# Patient Record
Sex: MALE | Race: WHITE | Employment: FULL TIME | URBAN - METROPOLITAN AREA
[De-identification: names, ages, dates, MRNs, and addresses within clinical notes are randomized per-mention and may not be internally consistent; named-entity substitution may affect disease eponyms.]

---

## 2018-09-27 ENCOUNTER — OFFICE VISIT (OUTPATIENT)
Dept: ORTHOPEDIC SURGERY | Age: 43
End: 2018-09-27
Payer: MEDICARE

## 2018-09-27 VITALS — HEIGHT: 67 IN | WEIGHT: 225 LBS | BODY MASS INDEX: 35.31 KG/M2

## 2018-09-27 DIAGNOSIS — M65.311 TRIGGER FINGER OF RIGHT THUMB: ICD-10-CM

## 2018-09-27 PROCEDURE — 20550 NJX 1 TENDON SHEATH/LIGAMENT: CPT | Performed by: ORTHOPAEDIC SURGERY

## 2018-09-27 PROCEDURE — 99203 OFFICE O/P NEW LOW 30 MIN: CPT | Performed by: ORTHOPAEDIC SURGERY

## 2018-09-27 NOTE — PROGRESS NOTES
injection risks and instructions were discussed. I will see the patient back on an as-needed basis if he fails to have complete resolution of symptoms. Please note that this transcription was created using voice recognition software. Any errors are unintentional and may be due to voice recognition transcription.

## 2022-03-11 ENCOUNTER — APPOINTMENT (OUTPATIENT)
Dept: GENERAL RADIOLOGY | Age: 47
End: 2022-03-11
Payer: COMMERCIAL

## 2022-03-11 ENCOUNTER — HOSPITAL ENCOUNTER (EMERGENCY)
Age: 47
Discharge: HOME | End: 2022-03-12
Payer: COMMERCIAL

## 2022-03-11 DIAGNOSIS — S82.851A CLOSED TRIMALLEOLAR FRACTURE OF RIGHT ANKLE, INITIAL ENCOUNTER: Primary | ICD-10-CM

## 2022-03-11 PROCEDURE — 29515 APPLICATION SHORT LEG SPLINT: CPT

## 2022-03-11 PROCEDURE — 73600 X-RAY EXAM OF ANKLE: CPT

## 2022-03-11 PROCEDURE — 99284 EMERGENCY DEPT VISIT MOD MDM: CPT

## 2022-03-11 PROCEDURE — 6370000000 HC RX 637 (ALT 250 FOR IP): Performed by: PHYSICIAN ASSISTANT

## 2022-03-11 RX ORDER — IBUPROFEN 800 MG/1
800 TABLET ORAL ONCE
Status: DISCONTINUED | OUTPATIENT
Start: 2022-03-11 | End: 2022-03-11

## 2022-03-11 RX ADMIN — IBUPROFEN 800 MG: 100 SUSPENSION ORAL at 23:41

## 2022-03-11 ASSESSMENT — ENCOUNTER SYMPTOMS
SINUS PRESSURE: 0
NAUSEA: 0
CONSTIPATION: 0
CHEST TIGHTNESS: 0
DIARRHEA: 0
COUGH: 0
VOMITING: 0
EYE DISCHARGE: 0
SINUS PAIN: 0
RHINORRHEA: 0
EYE REDNESS: 0
ABDOMINAL PAIN: 0
SHORTNESS OF BREATH: 0
SORE THROAT: 0

## 2022-03-11 ASSESSMENT — PAIN DESCRIPTION - ORIENTATION: ORIENTATION: RIGHT

## 2022-03-11 ASSESSMENT — PAIN - FUNCTIONAL ASSESSMENT: PAIN_FUNCTIONAL_ASSESSMENT: 0-10

## 2022-03-11 ASSESSMENT — PAIN DESCRIPTION - PAIN TYPE: TYPE: ACUTE PAIN

## 2022-03-11 ASSESSMENT — PAIN SCALES - GENERAL
PAINLEVEL_OUTOF10: 5
PAINLEVEL_OUTOF10: 5

## 2022-03-11 ASSESSMENT — PAIN DESCRIPTION - LOCATION: LOCATION: ANKLE

## 2022-03-12 VITALS
OXYGEN SATURATION: 98 % | RESPIRATION RATE: 16 BRPM | DIASTOLIC BLOOD PRESSURE: 102 MMHG | TEMPERATURE: 98.3 F | HEART RATE: 92 BPM | SYSTOLIC BLOOD PRESSURE: 162 MMHG

## 2022-03-12 PROCEDURE — 6370000000 HC RX 637 (ALT 250 FOR IP): Performed by: PHYSICIAN ASSISTANT

## 2022-03-12 RX ORDER — HYDROCODONE BITARTRATE AND ACETAMINOPHEN 5; 325 MG/1; MG/1
1 TABLET ORAL ONCE
Status: COMPLETED | OUTPATIENT
Start: 2022-03-12 | End: 2022-03-12

## 2022-03-12 RX ADMIN — HYDROCODONE BITARTRATE AND ACETAMINOPHEN 1 TABLET: 5; 325 TABLET ORAL at 01:02

## 2022-03-12 ASSESSMENT — PAIN SCALES - GENERAL: PAINLEVEL_OUTOF10: 7

## 2022-03-12 NOTE — ED NOTES
Pt loaded into wheelchair, crutches and walker given to family to take to car. Reviewed discharge instructions including follow ups, prescriptions and when to come back. Pt and family verbalized understanding.        Sofi Caballero RN  03/12/22 4999

## 2022-03-12 NOTE — ED PROVIDER NOTES
[facility]  ED  EMERGENCY DEPARTMENT ENCOUNTER        Pt Name: Kelby Marinelli  MRN: [id number]  [place] 1975  Date of evaluation: 3/11/2022  Provider: Ashley Anderson PA-C  PCP: Joe Bush MD  ED Attending: No att. providers found      This patient was not seen and evaluated by the attending physician. I have independently evaluated this patient. 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 Right ankle pain. Denies hitting head or LOC.  Ankle Pain       HISTORY OF PRESENT ILLNESS   (Location/Symptom, Timing/Onset, Context/Setting, Quality, Duration, Modifying Factors, Severity)  Note limiting factors. Kelby Marinelli is a 48 y.o. male for evaluation of right ankle pain, 5/10 throbbing, pain, unable to weight bear. Sudden onset of pain, 30 mins PTA, slipped on ice at work, working at [employer], pushing carts. Denies hitting head or LOC. No tx prior to arrival.      Nursing Notes were all reviewed and agreed with or any disagreements were addressed  in the HPI. REVIEW OF SYSTEMS  (2-9 systems for level 4, 10 or more for level 5)     Review of Systems   Constitutional: Negative for chills and fever. HENT: Negative. Negative for congestion, rhinorrhea, sinus pressure, sinus pain and sore throat. Eyes: Negative for discharge, redness and visual disturbance. Respiratory: Negative for cough, chest tightness and shortness of breath. Cardiovascular: Negative for chest pain and palpitations. Gastrointestinal: Negative for abdominal pain, constipation, diarrhea, nausea and vomiting. Genitourinary: Negative for difficulty urinating, dysuria and frequency. Musculoskeletal: Positive for arthralgias and myalgias. Skin: Negative. Neurological: Negative. Negative for dizziness, weakness, numbness and headaches. Psychiatric/Behavioral: Negative.     All other systems reviewed and are negative. Positivesand Pertinent negatives as per HPI. Except as noted above in the ROS, all other systems were reviewed and negative. PAST MEDICAL HISTORY   History reviewed. No pertinent past medical history. SURGICAL HISTORY     History reviewed. No pertinent surgical history. CURRENT MEDICATIONS       Discharge Medication List as of 3/12/2022  1:07 AM            ALLERGIES     Seasonal    FAMILY HISTORY     History reviewed. No pertinent family history. SOCIAL HISTORY       Social History     Socioeconomic History    Marital status: Single     Spouse name: None    Number of children: None    Years of education: None    Highest education level: None   Occupational History    None   Tobacco Use    Smoking status: Never Smoker    Smokeless tobacco: Never Used   Vaping Use    Vaping Use: Never used   Substance and Sexual Activity    Alcohol use: Never    Drug use: Never    Sexual activity: None   Other Topics Concern    None   Social History Narrative    None     Social Determinants of Health     Financial Resource Strain:     Difficulty of Paying Living Expenses: Not on file   Food Insecurity:     Worried About Running Out of Food in the Last Year: Not on file    Zahraa of Food in the Last Year: Not on file   Transportation Needs:     Lack of Transportation (Medical): Not on file    Lack of Transportation (Non-Medical):  Not on file   Physical Activity:     Days of Exercise per Week: Not on file    Minutes of Exercise per Session: Not on file   Stress:     Feeling of Stress : Not on file   Social Connections:     Frequency of Communication with Friends and Family: Not on file    Frequency of Social Gatherings with Friends and Family: Not on file    Attends Quaker Services: Not on file    Active Member of Clubs or Organizations: Not on file    Attends Club or Organization Meetings: Not on file    Marital Status: Not on file   Intimate Partner Violence:     Fear of Current or Ex-Partner: Not on file    Emotionally Abused: Not on file    Physically Abused: Not on file    Sexually Abused: Not on file   Housing Stability:     Unable to Pay for Housing in the Last Year: Not on file    Number of Places Lived in the Last Year: Not on file    Unstable Housing in the Last Year: Not on file       SCREENINGS     NIH Score       Glascow Anisa Coma Scale  Eye Opening: Spontaneous  Best Verbal Response: Oriented  Best Motor Response: Obeys commands  [place] Coma Scale Score: 15    Glascow Peds     Heart Score         PHYSICAL EXAM    (up to 7 for level 4, 8 ormore for level 5)     ED Triage Vitals [03/11/22 2322]   BP Temp Temp Source Pulse Resp SpO2 Height Weight   (!) 158/81 98.3 °F (36.8 °C) Oral 97 16 100 % -- --       Physical Exam  Vitals and nursing note reviewed. Constitutional:       Appearance: He is well-developed. HENT:      Head: Normocephalic and atraumatic. Eyes:      General:         Right eye: No discharge. Left eye: No discharge. Pulmonary:      Effort: Pulmonary effort is normal. No respiratory distress. Musculoskeletal:      Cervical back: Normal range of motion and neck supple. Right ankle: Swelling present. Tenderness present. Decreased range of motion. Right Achilles Tendon: Normal.      Left ankle: Normal.   Skin:     General: Skin is warm and dry. Coloration: Skin is not pale. Neurological:      Mental Status: He is alert and oriented to person, place, and time. Psychiatric:         Behavior: Behavior normal.           DIAGNOSTIC RESULTS   LABS:    Labs Reviewed - No data to display    All other labs were within normal range or notreturned as of this dictation. EKG: All EKG's are interpreted by the Emergency Department Physician who either signs or Co-signs this chart in the absence of a cardiologist.  Please see their note for interpretation of EKG.       RADIOLOGY:     Interpretation per the Radiologist below, if available at the time of this note:    XR ANKLE RIGHT (2 VIEWS)   Final Result   Trimalleolar fracture. Widening of the ankle mortise likely reflecting a syndesmotic injury. CONSULTS:  None      EMERGENCY DEPARTMENT COURSE and DIFFERENTIAL DIAGNOSIS/MDM:   Vitals:    Vitals:    03/11/22 2322 03/12/22 0028   BP: (!) 158/81 (!) 162/102   Pulse: 97 92   Resp: 16 16   Temp: 98.3 °F (36.8 °C)    TempSrc: Oral Oral   SpO2: 100% 98%       Patient was given the following medications:  Medications   ibuprofen (ADVIL;MOTRIN) 100 MG/5ML suspension 800 mg (800 mg Oral Given 3/11/22 2341)   HYDROcodone-acetaminophen (NORCO) 5-325 MG per tablet 1 tablet (1 tablet Oral Given 3/12/22 0102)         Afebrile, stable, patient presents to the ED for evaluation. Non toxic, NAD, SpO2 on room air of 100%, not hypoxic, xrays ordered to r/o acute bony injury. Provided with motrin liquid for pain control, liquid per pt request. Steven Daugherty show trimalleolar fracture. discussed imaging with attending. Pt is immobilized into a splint. Advised he will need to be non weight bearing. Pts mother now at bedside. Splint will be re-evaluated and confirm the patient can ambulate non weight bearing on crutches. Advised the importance of close follow up with Orthopedic, first thing Monday. All questions are answered. Indications for return to the ED are discussed. Patient is advised if any new or worsening symptoms arise they should immediately return to the emergency room. Follow-up with primary care in 1-2 days. The patient tolerated their visit well. The patient and / or the family were informed of the results of any tests, a time was given to answer questions, a plan was proposed and they agreed Marv Zavala.     ED Course as of 03/13/22 1506   Sat Mar 12, 2022   0040 Discussed pain medication with pharmacist, who advised ok to crush [AR]   [phone] Spoke with patients mother and father discussed need to be non-weight bearing. They verbalize understanding. They advise they have a wheeled walker at home, and live in a ranch home, they feel comfortable taking the patient home at this time.  [AR]      ED Course User Index  [AR] Noemy Piper PA-C       I estimate there is LOW risk for COMPARTMENT SYNDROME, DEEP VENOUS THROMBOSIS, SEPTIC ARTHRITIS, TENDON OR NEUROVASCULAR INJURY, thus I consider the discharge disposition reasonable. Eitan Marinelli and I have discussed the diagnosis and risks, and we agree with discharging home to follow-up with their primary doctor or the referral orthopedist. We also discussed returning to the Emergency Department immediately if new or worsening symptoms occur. We have discussed the symptoms which are most concerning (e.g., changing or worsening pain, numbness, weakness) that necessitate immediate return. FINAL IMPRESSION      1. Closed trimalleolar fracture of right ankle, initial encounter          DISPOSITION/PLAN   DISPOSITION    D/c to home    PATIENT REFERRED TO:  Wu Billings MD  [phone]  [facility], [address] [place] [address]  [phone]      Call first thing MONDAY morning and set up a follow up appt. DISCHARGE MEDICATIONS:  Discharge Medication List as of 3/12/2022  1:07 AM      START taking these medications    Details   ibuprofen (CHILDRENS ADVIL) 100 MG/5ML suspension Take 30 mLs by mouth every 8 hours as needed for Pain, Disp-900 mL, R-0Print      HYDROcodone-acetaminophen 7.5-325 MG per 15ML solution Take 15 mLs by mouth every 6 hours as needed for Pain (only for most severe pain, pain medication precautions) for up to 3 days. , Disp-180 mL, R-0Print             DISCONTINUED MEDICATIONS:  Discharge Medication List as of 3/12/2022  1:07 AM            Periodic Controlled Substance Monitoring: No signs of potential drug abuse or diversion identified.  (Noemy Piper PA-C)    Pt was seen during the [place] 19 pandemic. Appropriate PPE worn by ME during patient encounters. Pt seen during a time with constrained hospital bed capacity and other potential inpatient and outpatient resources were constrained due to the viral pandemic.    Please note that portions of this note were completed with a voice recognition program.  Efforts were made to edit the dictations but occasionally words are mis-transcribed.)    Noemy Piper PA-C (electronically signed)        Noemy Piper PA-C  03/12/22 0567 Mj Doherty PA-C  03/13/22 3809

## 2022-03-15 ENCOUNTER — OFFICE VISIT (OUTPATIENT)
Dept: ORTHOPEDIC SURGERY | Age: 47
End: 2022-03-15
Payer: COMMERCIAL

## 2022-03-15 ENCOUNTER — TELEPHONE (OUTPATIENT)
Dept: ORTHOPEDIC SURGERY | Age: 47
End: 2022-03-15

## 2022-03-15 VITALS — HEIGHT: 67 IN | BODY MASS INDEX: 35.31 KG/M2 | WEIGHT: 225 LBS

## 2022-03-15 DIAGNOSIS — S82.851A CLOSED DISPLACED TRIMALLEOLAR FRACTURE OF RIGHT ANKLE, INITIAL ENCOUNTER: Primary | ICD-10-CM

## 2022-03-15 PROCEDURE — 99203 OFFICE O/P NEW LOW 30 MIN: CPT | Performed by: ORTHOPAEDIC SURGERY

## 2022-03-15 RX ORDER — LORATADINE 10 MG/1
CAPSULE, LIQUID FILLED ORAL
COMMUNITY

## 2022-03-15 RX ORDER — FENOFIBRATE 160 MG/1
TABLET ORAL
COMMUNITY
Start: 2022-03-14

## 2022-03-15 RX ORDER — AMLODIPINE BESYLATE 5 MG/1
TABLET ORAL
COMMUNITY
Start: 2022-03-14

## 2022-03-15 NOTE — PROGRESS NOTES
CHIEF COMPLAINT: Right ankle pain / trimalleolar fracture. DATE OF INJURY: 3/11/2022, Worker's Comp    HISTORY:  Mr. Ivar Severance 48 y.o.  male presents today for the first visit for evaluation of a right ankle injury which occurred when he was walking at [address] and tripped. He was first seen and evaluated in [facility], where he was x-rayed, splinted and asked to f/u with Orthopedics. He is complaining of medial & lateral ankle pain and swelling. This is better with elevation and worse with bearing any wt. The pain is sharp and not radiating. No numbness or tingling sensation. Alleviating factors: elevation and rest. No other complaint. Past Medical History:   Diagnosis Date    Hyperlipidemia     Hypertension        No past surgical history on file. Social History     Socioeconomic History    Marital status: Single     Spouse name: Not on file    Number of children: Not on file    Years of education: Not on file    Highest education level: Not on file   Occupational History    Not on file   Tobacco Use    Smoking status: Never Smoker    Smokeless tobacco: Never Used   Vaping Use    Vaping Use: Never used   Substance and Sexual Activity    Alcohol use: Never    Drug use: Never    Sexual activity: Not on file   Other Topics Concern    Not on file   Social History Narrative    Not on file     Social Determinants of Health     Financial Resource Strain:     Difficulty of Paying Living Expenses: Not on file   Food Insecurity:     Worried About [address] in the Last Year: Not on file    Zahraa of Food in the Last Year: Not on file   Transportation Needs:     Lack of Transportation (Medical): Not on file    Lack of Transportation (Non-Medical):  Not on file   Physical Activity:     Days of Exercise per Week: Not on file    Minutes of Exercise per Session: Not on file   Stress:     Feeling of Stress : Not on file   Social Connections:     Frequency of Communication with Friends and Family: Not on file    Frequency of Social Gatherings with Friends and Family: Not on file    Attends Sabianist Services: Not on file    Active Member of Clubs or Organizations: Not on file    Attends Club or Organization Meetings: Not on file    Marital Status: Not on file   Intimate Partner Violence:     Fear of Current or Ex-Partner: Not on file    Emotionally Abused: Not on file    Physically Abused: Not on file    Sexually Abused: Not on file   Housing Stability:     Unable to Pay for Housing in the Last Year: Not on file    Number of [place] in the Last Year: Not on file    Unstable Housing in the Last Year: Not on file       No family history on file. Current Outpatient Medications on File Prior to Visit   Medication Sig Dispense Refill    loratadine (CLARITIN) 10 MG capsule Take by mouth      ibuprofen (CHILDRENS ADVIL) 100 MG/5ML suspension Take 30 mLs by mouth every 8 hours as needed for Pain 900 mL 0    HYDROcodone-acetaminophen 7.5-325 MG per 15ML solution Take 15 mLs by mouth every 6 hours as needed for Pain (only for most severe pain, pain medication precautions) for up to 3 days. 180 mL 0     No current facility-administered medications on file prior to visit. Pertinent items are noted in HPI  Review of systems reviewed from Patient History Form and available in the patient's chart under the Media tab. PHYSICAL EXAMINATION:  Mr. Elisa Price is a very pleasant 48 y.o.  male who presents today in no acute distress, awake, alert, and oriented. He is well dressed, nourished and  groomed. Patient with normal affect. Height is  5' 7\" (1.702 m), weight is 225 lb (102.1 kg), Body mass index is 35.24 kg/m². Resting respiratory rate is 16. Examination of the gait, showed that the patient walks with a crutch, NWB right leg and in a splint . Examination of both ankles showing a decreased range of motion of the right ankle compare to the other side.   There is moderate swelling that can be seen, as well as ecchymosis. He has intact sensation and good pedal pulses. He has significant tenderness on deep palpation over the medial & lateral malleolus of the right ankle. IMAGING: Xrays, 3 views of the right ankle dated 3/11/2022 from [facility],  were reviewed, and showed a moderately displaced trimalleolar fracture. IMPRESSION: Right ankle moderately displaced trimalleolar fracture. PLAN:   I discussed with Ian Kumari the overall alignment of the fracture and treatment options including both surgical and non-surgical treatment, and that my recommendation is an open reduction and internal fixation given the amount of displacement and comminution of the fracture. I discussed the risks and benefits of surgery with the patient, including but not limited to infection, bleeding, pain, injury to nerves or blood vessels failure of the surgery and need for additional surgery. All the patient's questions were answered. We discussed an expected post-operative course. He  is understanding of this and wishes to proceed.        Luis Miguel Patel MD

## 2022-03-15 NOTE — TELEPHONE ENCOUNTER
Spoke to patient's mother. [facility] in [place] is the preferred location to send RX for knee scooter.     P: [phone]  F: [phone]

## 2022-03-15 NOTE — TELEPHONE ENCOUNTER
Other PATIENT WANTING TO KNOW IF HE COULD USE A  KNEE SCOOTER AND IF HE CAN GET A RX FOR ONE, PLS CALL TO [facility]ISE  [phone]

## 2022-03-15 NOTE — LETTER
[facility]  Surgery Scheduling Form:      DEMOGRAPHICS:                                                                                                                  Patient Name:    Patient :  1975   Patient SS#:      Patient Phone:  [phone] (home) [phone] (work) Alt. Patient Phone:    Patient Address:  [facility] [address]    PCP:  Tez Nagy MD    Insurance ID Number:  Payor/Plan Subscr  Sex Relation Sub. Ins. ID Effective Group Num   1. Murl Collado * BERNARDO MILLAN 1975 Male Self [id number] 3/11/22                                    [address] [address]   2. [employer]* BERNARDO MILLAN 1975 Male Self [id number] 3/1/22 [id number]                                   [address] [address]       DIAGNOSIS & PROCEDURE:                                                                                                Diagnosis:   Right trimalleolar ankle fracture S82.851A  Operation:  Open reduction internal fixation Right ankle with syndesmosis repair 42979,  75267  Location: [facility]  Provider:  Khris Eubanks MD      [facility] INFORMATION:                                                                                         .    Requested Date:   22   OR Time:  9:45                      Patient Arrival Time:  7:45  OR Time Required:  60 Minutes  Anesthesia:  General  Equipment:  [place]  Mini C-Arm:  No   Standard C-Arm:  Yes  Status:  Outpatient  PAT Required:  Yes  Comments:   COVID: to be completed day of surgery        Cherie Eubanks MD     03/15/22   Pre Operative Physician Prophylaxis Orders - SCIP Protocols      Patient:   :    1975    Procedure: 3-17-22 Open reduction internal fixation Right ankle 98253    COVID: to be completed day of surgery  HEIGHT:  Ht Readings from Last 1 Encounters:   03/15/22 5' 7\" (1.702 m)                      WEIGHT:  Wt Readings from Last 1 Encounters:   03/15/22 225 lb (102.1 kg) History & Physical:  Mamadou.Search ] By Surgeon  [ ]By PCP  [ Kb Retana Report     Pre-Operative Antibiotic Order:     []  ----  No Antibiotic Ordered       [x]  ----  Give the following Antibiotic within 1 hour prior to start time:                                                      Cefazolin 2g IV <119.9kg (264lbs) OR 3g if >120kg (802DAH)       or      Clindamycin 900 mg IV (over 1 hour) if patient is allergic to           PENICILLINS or CAPHALOSPORIN       VTE prophylaxis [ ] Thigh hi  TEDS  [ ]  Knee Hi TEDS [ ] Foot Pumps [ ] Compression Devices      Physician Signature:     Date: 3/15/22  Time: 10:38 AM

## 2022-03-17 ENCOUNTER — HOSPITAL ENCOUNTER (OUTPATIENT)
Age: 47
Setting detail: OUTPATIENT SURGERY
Discharge: HOME | End: 2022-03-17
Attending: ORTHOPAEDIC SURGERY | Admitting: ORTHOPAEDIC SURGERY
Payer: COMMERCIAL

## 2022-03-17 ENCOUNTER — ANESTHESIA (OUTPATIENT)
Dept: OPERATING ROOM | Age: 47
End: 2022-03-17
Payer: COMMERCIAL

## 2022-03-17 ENCOUNTER — APPOINTMENT (OUTPATIENT)
Dept: GENERAL RADIOLOGY | Age: 47
End: 2022-03-17
Attending: ORTHOPAEDIC SURGERY
Payer: COMMERCIAL

## 2022-03-17 ENCOUNTER — ANESTHESIA EVENT (OUTPATIENT)
Dept: OPERATING ROOM | Age: 47
End: 2022-03-17
Payer: COMMERCIAL

## 2022-03-17 VITALS
OXYGEN SATURATION: 96 % | RESPIRATION RATE: 13 BRPM | DIASTOLIC BLOOD PRESSURE: 67 MMHG | TEMPERATURE: 97.9 F | SYSTOLIC BLOOD PRESSURE: 122 MMHG

## 2022-03-17 VITALS
HEIGHT: 67 IN | DIASTOLIC BLOOD PRESSURE: 73 MMHG | WEIGHT: 230 LBS | OXYGEN SATURATION: 93 % | TEMPERATURE: 97.6 F | HEART RATE: 80 BPM | RESPIRATION RATE: 19 BRPM | SYSTOLIC BLOOD PRESSURE: 105 MMHG | BODY MASS INDEX: 36.1 KG/M2

## 2022-03-17 DIAGNOSIS — S82.851A CLOSED DISPLACED TRIMALLEOLAR FRACTURE OF RIGHT ANKLE, INITIAL ENCOUNTER: Primary | ICD-10-CM

## 2022-03-17 LAB — SARS-COV-2, NAAT: NOT DETECTED

## 2022-03-17 PROCEDURE — 2580000003 HC RX 258: Performed by: ANESTHESIOLOGY

## 2022-03-17 PROCEDURE — 2720000010 HC SURG SUPPLY STERILE: Performed by: ORTHOPAEDIC SURGERY

## 2022-03-17 PROCEDURE — 27822 TREATMENT OF ANKLE FRACTURE: CPT | Performed by: ORTHOPAEDIC SURGERY

## 2022-03-17 PROCEDURE — 2500000003 HC RX 250 WO HCPCS: Performed by: NURSE ANESTHETIST, CERTIFIED REGISTERED

## 2022-03-17 PROCEDURE — 27822 TREATMENT OF ANKLE FRACTURE: CPT | Performed by: NURSE PRACTITIONER

## 2022-03-17 PROCEDURE — 2580000003 HC RX 258: Performed by: NURSE ANESTHETIST, CERTIFIED REGISTERED

## 2022-03-17 PROCEDURE — 7100000001 HC PACU RECOVERY - ADDTL 15 MIN: Performed by: ORTHOPAEDIC SURGERY

## 2022-03-17 PROCEDURE — 7100000011 HC PHASE II RECOVERY - ADDTL 15 MIN: Performed by: ORTHOPAEDIC SURGERY

## 2022-03-17 PROCEDURE — 2709999900 HC NON-CHARGEABLE SUPPLY: Performed by: ORTHOPAEDIC SURGERY

## 2022-03-17 PROCEDURE — 3209999900 FLUORO FOR SURGICAL PROCEDURES

## 2022-03-17 PROCEDURE — 87635 SARS-COV-2 COVID-19 AMP PRB: CPT

## 2022-03-17 PROCEDURE — 73600 X-RAY EXAM OF ANKLE: CPT

## 2022-03-17 PROCEDURE — 3600000004 HC SURGERY LEVEL 4 BASE: Performed by: ORTHOPAEDIC SURGERY

## 2022-03-17 PROCEDURE — 27829 TREAT LOWER LEG JOINT: CPT | Performed by: NURSE PRACTITIONER

## 2022-03-17 PROCEDURE — 2500000003 HC RX 250 WO HCPCS: Performed by: ORTHOPAEDIC SURGERY

## 2022-03-17 PROCEDURE — 27829 TREAT LOWER LEG JOINT: CPT | Performed by: ORTHOPAEDIC SURGERY

## 2022-03-17 PROCEDURE — 7100000000 HC PACU RECOVERY - FIRST 15 MIN: Performed by: ORTHOPAEDIC SURGERY

## 2022-03-17 PROCEDURE — 7100000010 HC PHASE II RECOVERY - FIRST 15 MIN: Performed by: ORTHOPAEDIC SURGERY

## 2022-03-17 PROCEDURE — 3600000014 HC SURGERY LEVEL 4 ADDTL 15MIN: Performed by: ORTHOPAEDIC SURGERY

## 2022-03-17 PROCEDURE — 6370000000 HC RX 637 (ALT 250 FOR IP): Performed by: ANESTHESIOLOGY

## 2022-03-17 PROCEDURE — 6360000002 HC RX W HCPCS: Performed by: NURSE ANESTHETIST, CERTIFIED REGISTERED

## 2022-03-17 PROCEDURE — C1713 ANCHOR/SCREW BN/BN,TIS/BN: HCPCS | Performed by: ORTHOPAEDIC SURGERY

## 2022-03-17 PROCEDURE — A4217 STERILE WATER/SALINE, 500 ML: HCPCS | Performed by: ORTHOPAEDIC SURGERY

## 2022-03-17 PROCEDURE — 3700000001 HC ADD 15 MINUTES (ANESTHESIA): Performed by: ORTHOPAEDIC SURGERY

## 2022-03-17 PROCEDURE — 3700000000 HC ANESTHESIA ATTENDED CARE: Performed by: ORTHOPAEDIC SURGERY

## 2022-03-17 PROCEDURE — 6360000002 HC RX W HCPCS: Performed by: ORTHOPAEDIC SURGERY

## 2022-03-17 PROCEDURE — 2580000003 HC RX 258: Performed by: ORTHOPAEDIC SURGERY

## 2022-03-17 DEVICE — SCREW BNE L16MM DIA3.5MM HD DIA2.7MM PERIARTC CORT S STL ST: Type: IMPLANTABLE DEVICE | Site: ANKLE | Status: FUNCTIONAL

## 2022-03-17 DEVICE — PLATE BNE L80MM 4 H R LAT DST PERIARTC FIBULAR S STL LOK: Type: IMPLANTABLE DEVICE | Site: ANKLE | Status: FUNCTIONAL

## 2022-03-17 DEVICE — SCREW BNE L14MM DIA2.7MM HEX HD DIA2.5MM CANC BIODUR 108C: Type: IMPLANTABLE DEVICE | Site: ANKLE | Status: FUNCTIONAL

## 2022-03-17 DEVICE — SCREW BNE L65MM DIA4MM CANC SELF DRL ST CANN NONLOCKING 1 2
Type: IMPLANTABLE DEVICE | Site: ANKLE | Status: NON-FUNCTIONAL
Removed: 2022-08-04

## 2022-03-17 DEVICE — SCREW BNE L50MM DIA3.5MM HD DIA2.7MM LNG PERIARTC ST: Type: IMPLANTABLE DEVICE | Site: ANKLE | Status: FUNCTIONAL

## 2022-03-17 DEVICE — SCREW BNE L18MM DIA2.7MM HEX HD DIA2.5MM CANC BIODUR 108C: Type: IMPLANTABLE DEVICE | Site: ANKLE | Status: FUNCTIONAL

## 2022-03-17 DEVICE — SCREW BONE L70MM D4MM STNDRD CNCLLS SELF TPPNG SELF DRLLNG C: Type: IMPLANTABLE DEVICE | Site: ANKLE | Status: FUNCTIONAL

## 2022-03-17 DEVICE — SCREW BNE L14MM DIA3.5MM HD DIA2.7MM CORT PERIARTC S STL ST: Type: IMPLANTABLE DEVICE | Site: ANKLE | Status: FUNCTIONAL

## 2022-03-17 DEVICE — SCREW BNE L16MM DIA2.7MM HEX HD DIA2.5MM CANC BIODUR 108C: Type: IMPLANTABLE DEVICE | Site: ANKLE | Status: FUNCTIONAL

## 2022-03-17 DEVICE — SCREW BNE L22MM DIA2.7MM CORT ANK FT S STL ST NONCANNULATED: Type: IMPLANTABLE DEVICE | Site: ANKLE | Status: FUNCTIONAL

## 2022-03-17 RX ORDER — BUPIVACAINE HYDROCHLORIDE 5 MG/ML
INJECTION, SOLUTION EPIDURAL; INTRACAUDAL
Status: COMPLETED | OUTPATIENT
Start: 2022-03-17 | End: 2022-03-17

## 2022-03-17 RX ORDER — HYDROMORPHONE HCL 110MG/55ML
PATIENT CONTROLLED ANALGESIA SYRINGE INTRAVENOUS PRN
Status: DISCONTINUED | OUTPATIENT
Start: 2022-03-17 | End: 2022-03-17

## 2022-03-17 RX ORDER — HYDROMORPHONE HCL 110MG/55ML
PATIENT CONTROLLED ANALGESIA SYRINGE INTRAVENOUS PRN
Status: DISCONTINUED | OUTPATIENT
Start: 2022-03-17 | End: 2022-03-17 | Stop reason: SDUPTHER

## 2022-03-17 RX ORDER — LABETALOL HYDROCHLORIDE 5 MG/ML
5 INJECTION, SOLUTION INTRAVENOUS
Status: DISCONTINUED | OUTPATIENT
Start: 2022-03-17 | End: 2022-03-17 | Stop reason: HOSPADM

## 2022-03-17 RX ORDER — FENTANYL CITRATE 50 UG/ML
INJECTION, SOLUTION INTRAMUSCULAR; INTRAVENOUS PRN
Status: DISCONTINUED | OUTPATIENT
Start: 2022-03-17 | End: 2022-03-17 | Stop reason: SDUPTHER

## 2022-03-17 RX ORDER — DEXAMETHASONE SODIUM PHOSPHATE 4 MG/ML
INJECTION, SOLUTION INTRA-ARTICULAR; INTRALESIONAL; INTRAMUSCULAR; INTRAVENOUS; SOFT TISSUE PRN
Status: DISCONTINUED | OUTPATIENT
Start: 2022-03-17 | End: 2022-03-17 | Stop reason: SDUPTHER

## 2022-03-17 RX ORDER — MIDAZOLAM HYDROCHLORIDE 1 MG/ML
INJECTION INTRAMUSCULAR; INTRAVENOUS PRN
Status: DISCONTINUED | OUTPATIENT
Start: 2022-03-17 | End: 2022-03-17 | Stop reason: SDUPTHER

## 2022-03-17 RX ORDER — HYDROMORPHONE HCL 110MG/55ML
0.25 PATIENT CONTROLLED ANALGESIA SYRINGE INTRAVENOUS EVERY 5 MIN PRN
Status: DISCONTINUED | OUTPATIENT
Start: 2022-03-17 | End: 2022-03-17 | Stop reason: HOSPADM

## 2022-03-17 RX ORDER — HYDROCODONE BITARTRATE AND ACETAMINOPHEN 5; 325 MG/1; MG/1
1 TABLET ORAL EVERY 6 HOURS PRN
Qty: 10 TABLET | Refills: 0 | Status: SHIPPED | OUTPATIENT
Start: 2022-03-17 | End: 2022-03-20

## 2022-03-17 RX ORDER — ACETAMINOPHEN 160 MG/5ML
15 SUSPENSION, ORAL (FINAL DOSE FORM) ORAL EVERY 4 HOURS PRN
COMMUNITY

## 2022-03-17 RX ORDER — ACETAMINOPHEN 160 MG/5ML
650 SOLUTION ORAL ONCE
Status: COMPLETED | OUTPATIENT
Start: 2022-03-17 | End: 2022-03-17

## 2022-03-17 RX ORDER — SODIUM CHLORIDE 9 MG/ML
INJECTION, SOLUTION INTRAVENOUS CONTINUOUS PRN
Status: DISCONTINUED | OUTPATIENT
Start: 2022-03-17 | End: 2022-03-17 | Stop reason: SDUPTHER

## 2022-03-17 RX ORDER — CEPHALEXIN 500 MG/1
500 CAPSULE ORAL 4 TIMES DAILY
Qty: 20 CAPSULE | Refills: 0 | Status: SHIPPED | OUTPATIENT
Start: 2022-03-17 | End: 2022-03-18 | Stop reason: ALTCHOICE

## 2022-03-17 RX ORDER — ONDANSETRON 2 MG/ML
4 INJECTION INTRAMUSCULAR; INTRAVENOUS
Status: DISCONTINUED | OUTPATIENT
Start: 2022-03-17 | End: 2022-03-17 | Stop reason: HOSPADM

## 2022-03-17 RX ORDER — HYDROMORPHONE HCL 110MG/55ML
0.5 PATIENT CONTROLLED ANALGESIA SYRINGE INTRAVENOUS EVERY 5 MIN PRN
Status: DISCONTINUED | OUTPATIENT
Start: 2022-03-17 | End: 2022-03-17 | Stop reason: HOSPADM

## 2022-03-17 RX ORDER — ROCURONIUM BROMIDE 10 MG/ML
INJECTION, SOLUTION INTRAVENOUS PRN
Status: DISCONTINUED | OUTPATIENT
Start: 2022-03-17 | End: 2022-03-17 | Stop reason: SDUPTHER

## 2022-03-17 RX ORDER — SODIUM CHLORIDE 9 MG/ML
INJECTION, SOLUTION INTRAVENOUS CONTINUOUS
Status: DISCONTINUED | OUTPATIENT
Start: 2022-03-17 | End: 2022-03-17 | Stop reason: HOSPADM

## 2022-03-17 RX ORDER — LIDOCAINE HYDROCHLORIDE 10 MG/ML
1 INJECTION, SOLUTION EPIDURAL; INFILTRATION; INTRACAUDAL; PERINEURAL
Status: DISCONTINUED | OUTPATIENT
Start: 2022-03-17 | End: 2022-03-17 | Stop reason: HOSPADM

## 2022-03-17 RX ORDER — SUCCINYLCHOLINE/SOD CL,ISO/PF 200MG/10ML
SYRINGE (ML) INTRAVENOUS PRN
Status: DISCONTINUED | OUTPATIENT
Start: 2022-03-17 | End: 2022-03-17 | Stop reason: SDUPTHER

## 2022-03-17 RX ORDER — LIDOCAINE HYDROCHLORIDE 20 MG/ML
INJECTION, SOLUTION EPIDURAL; INFILTRATION; INTRACAUDAL; PERINEURAL PRN
Status: DISCONTINUED | OUTPATIENT
Start: 2022-03-17 | End: 2022-03-17 | Stop reason: SDUPTHER

## 2022-03-17 RX ORDER — GLYCOPYRROLATE 1 MG/5 ML
SYRINGE (ML) INTRAVENOUS PRN
Status: DISCONTINUED | OUTPATIENT
Start: 2022-03-17 | End: 2022-03-17 | Stop reason: SDUPTHER

## 2022-03-17 RX ORDER — PROPOFOL 10 MG/ML
INJECTION, EMULSION INTRAVENOUS PRN
Status: DISCONTINUED | OUTPATIENT
Start: 2022-03-17 | End: 2022-03-17 | Stop reason: SDUPTHER

## 2022-03-17 RX ORDER — ONDANSETRON 2 MG/ML
INJECTION INTRAMUSCULAR; INTRAVENOUS PRN
Status: DISCONTINUED | OUTPATIENT
Start: 2022-03-17 | End: 2022-03-17 | Stop reason: SDUPTHER

## 2022-03-17 RX ORDER — KETAMINE HCL IN NACL, ISO-OSM 100MG/10ML
SYRINGE (ML) INJECTION PRN
Status: DISCONTINUED | OUTPATIENT
Start: 2022-03-17 | End: 2022-03-17 | Stop reason: SDUPTHER

## 2022-03-17 RX ORDER — OXYCODONE HYDROCHLORIDE 5 MG/1
5 TABLET ORAL
Status: DISCONTINUED | OUTPATIENT
Start: 2022-03-17 | End: 2022-03-17 | Stop reason: HOSPADM

## 2022-03-17 RX ADMIN — HYDROMORPHONE HYDROCHLORIDE 1 MG: 2 INJECTION, SOLUTION INTRAMUSCULAR; INTRAVENOUS; SUBCUTANEOUS at 11:36

## 2022-03-17 RX ADMIN — Medication 160 MG: at 09:55

## 2022-03-17 RX ADMIN — FENTANYL CITRATE 50 MCG: 50 INJECTION, SOLUTION INTRAMUSCULAR; INTRAVENOUS at 09:55

## 2022-03-17 RX ADMIN — ACETAMINOPHEN ORAL SOLUTION 650 MG: 650 SOLUTION ORAL at 08:28

## 2022-03-17 RX ADMIN — CEFAZOLIN 2000 MG: 10 INJECTION, POWDER, FOR SOLUTION INTRAVENOUS at 09:48

## 2022-03-17 RX ADMIN — PROPOFOL 140 MG: 10 INJECTION, EMULSION INTRAVENOUS at 09:55

## 2022-03-17 RX ADMIN — SODIUM CHLORIDE: 9 INJECTION, SOLUTION INTRAVENOUS at 07:48

## 2022-03-17 RX ADMIN — HYDROMORPHONE HYDROCHLORIDE 1 MG: 2 INJECTION, SOLUTION INTRAMUSCULAR; INTRAVENOUS; SUBCUTANEOUS at 11:28

## 2022-03-17 RX ADMIN — SUGAMMADEX 200 MG: 100 INJECTION, SOLUTION INTRAVENOUS at 11:34

## 2022-03-17 RX ADMIN — ROCURONIUM BROMIDE 10 MG: 10 INJECTION, SOLUTION INTRAVENOUS at 09:55

## 2022-03-17 RX ADMIN — FENTANYL CITRATE 50 MCG: 50 INJECTION, SOLUTION INTRAMUSCULAR; INTRAVENOUS at 10:10

## 2022-03-17 RX ADMIN — ONDANSETRON 4 MG: 2 INJECTION INTRAMUSCULAR; INTRAVENOUS at 10:08

## 2022-03-17 RX ADMIN — DEXAMETHASONE SODIUM PHOSPHATE 8 MG: 4 INJECTION, SOLUTION INTRAMUSCULAR; INTRAVENOUS at 10:10

## 2022-03-17 RX ADMIN — LIDOCAINE HYDROCHLORIDE 100 MG: 20 INJECTION, SOLUTION EPIDURAL; INFILTRATION; INTRACAUDAL; PERINEURAL at 09:55

## 2022-03-17 RX ADMIN — Medication 30 MG: at 10:10

## 2022-03-17 RX ADMIN — Medication 0.2 MG: at 10:24

## 2022-03-17 RX ADMIN — MIDAZOLAM 2 MG: 1 INJECTION INTRAMUSCULAR; INTRAVENOUS at 09:49

## 2022-03-17 RX ADMIN — ROCURONIUM BROMIDE 40 MG: 10 INJECTION, SOLUTION INTRAVENOUS at 10:21

## 2022-03-17 RX ADMIN — SODIUM CHLORIDE: 9 INJECTION, SOLUTION INTRAVENOUS at 09:49

## 2022-03-17 ASSESSMENT — PULMONARY FUNCTION TESTS
PIF_VALUE: 27
PIF_VALUE: 15
PIF_VALUE: 26
PIF_VALUE: 27
PIF_VALUE: 26
PIF_VALUE: 26
PIF_VALUE: 27
PIF_VALUE: 27
PIF_VALUE: 26
PIF_VALUE: 26
PIF_VALUE: 27
PIF_VALUE: 23
PIF_VALUE: 27
PIF_VALUE: 31
PIF_VALUE: 26
PIF_VALUE: 26
PIF_VALUE: 27
PIF_VALUE: 26
PIF_VALUE: 26
PIF_VALUE: 23
PIF_VALUE: 26
PIF_VALUE: 2
PIF_VALUE: 26
PIF_VALUE: 23
PIF_VALUE: 2
PIF_VALUE: 26
PIF_VALUE: 26
PIF_VALUE: 27
PIF_VALUE: 2
PIF_VALUE: 27
PIF_VALUE: 27
PIF_VALUE: 1
PIF_VALUE: 18
PIF_VALUE: 23
PIF_VALUE: 27
PIF_VALUE: 6
PIF_VALUE: 27
PIF_VALUE: 23
PIF_VALUE: 27
PIF_VALUE: 26
PIF_VALUE: 26
PIF_VALUE: 19
PIF_VALUE: 27
PIF_VALUE: 1
PIF_VALUE: 26
PIF_VALUE: 9
PIF_VALUE: 1
PIF_VALUE: 24
PIF_VALUE: 26
PIF_VALUE: 16
PIF_VALUE: 26
PIF_VALUE: 27
PIF_VALUE: 31
PIF_VALUE: 26
PIF_VALUE: 7
PIF_VALUE: 26
PIF_VALUE: 26
PIF_VALUE: 23
PIF_VALUE: 24
PIF_VALUE: 0
PIF_VALUE: 27
PIF_VALUE: 3
PIF_VALUE: 23
PIF_VALUE: 27
PIF_VALUE: 26
PIF_VALUE: 5
PIF_VALUE: 12
PIF_VALUE: 0
PIF_VALUE: 23
PIF_VALUE: 26
PIF_VALUE: 27
PIF_VALUE: 27
PIF_VALUE: 4
PIF_VALUE: 1
PIF_VALUE: 19
PIF_VALUE: 1
PIF_VALUE: 2
PIF_VALUE: 26
PIF_VALUE: 0
PIF_VALUE: 24
PIF_VALUE: 26
PIF_VALUE: 23
PIF_VALUE: 0
PIF_VALUE: 27
PIF_VALUE: 27
PIF_VALUE: 26
PIF_VALUE: 26
PIF_VALUE: 30
PIF_VALUE: 4
PIF_VALUE: 26
PIF_VALUE: 27
PIF_VALUE: 6
PIF_VALUE: 27
PIF_VALUE: 26
PIF_VALUE: 26
PIF_VALUE: 2
PIF_VALUE: 26
PIF_VALUE: 27
PIF_VALUE: 26
PIF_VALUE: 23
PIF_VALUE: 26
PIF_VALUE: 23
PIF_VALUE: 25
PIF_VALUE: 23
PIF_VALUE: 26
PIF_VALUE: 26
PIF_VALUE: 27
PIF_VALUE: 26
PIF_VALUE: 6

## 2022-03-17 ASSESSMENT — PAIN - FUNCTIONAL ASSESSMENT
PAIN_FUNCTIONAL_ASSESSMENT: 0-10
PAIN_FUNCTIONAL_ASSESSMENT: PREVENTS OR INTERFERES SOME ACTIVE ACTIVITIES AND ADLS

## 2022-03-17 ASSESSMENT — PAIN SCALES - GENERAL: PAINLEVEL_OUTOF10: 4

## 2022-03-17 ASSESSMENT — PAIN DESCRIPTION - DESCRIPTORS: DESCRIPTORS: STABBING;THROBBING

## 2022-03-17 NOTE — ANESTHESIA PRE PROCEDURE
Department of Anesthesiology  Preprocedure Note       Name:  Honey Schmid   Age:  48 y.o.  :  1975                                          MRN:  [id number]         Date:  3/17/2022      Surgeon: Luigi Guillen):  Alexandra Florence MD    Procedure: Procedure(s):  OPEN REDUCTION INTERNAL FIXATION RIGHT ANKLE WITH SYNDESMOSIS REPAIR (39384, 60048)-RADHA    Medications prior to admission:   Prior to Admission medications    Medication Sig Start Date End Date Taking? Authorizing Provider   acetaminophen (TYLENOL) 160 MG/5ML suspension Take 15 mg/kg by mouth every 4 hours as needed for Fever   Yes Historical Provider, MD   fenofibrate (TRIGLIDE) 160 MG tablet TAKE 1 TABLET EVERY DAY WITH FOOD 3/14/22  Yes Historical Provider, MD   amLODIPine (NORVASC) 5 MG tablet TAKE 1 TABLET EVERY DAY 3/14/22  Yes Historical Provider, MD   loratadine (CLARITIN) 10 MG capsule Take by mouth    Historical Provider, MD   ibuprofen (CHILDRENS ADVIL) 100 MG/5ML suspension Take 30 mLs by mouth every 8 hours as needed for Pain 3/12/22 3/22/22  Karuna Patterson PA-C       Current medications:    Current Facility-Administered Medications   Medication Dose Route Frequency Provider Last Rate Last Admin    ceFAZolin (ANCEF) 2000 mg in dextrose 5 % 50 mL IVPB  2,000 mg IntraVENous On Call to [address] [place] [address], MD        HYDROmorphone (DILAUDID) injection 0.25 mg  0.25 mg IntraVENous Q5 Min PRN Karlie Biswas MD        HYDROmorphone (DILAUDID) injection 0.5 mg  0.5 mg IntraVENous Q5 Min PRN Karlie Biswas MD        oxyCODONE (ROXICODONE) immediate release tablet 5 mg  5 mg Oral Once PRN Karlie Biswas MD        ondansetron [facility]) injection 4 mg  4 mg IntraVENous Once PRN Karlie Biswas MD        labetalol (NORMODYNE;TRANDATE) injection 5 mg  5 mg IntraVENous Q15 Min PRN Karlie Biswas MD        0.9 % sodium chloride infusion   IntraVENous Continuous Karlie Biswas MD           Allergies:     Allergies   Allergen Reactions  Seasonal        Problem List:    Patient Active Problem List   Diagnosis Code    Trigger finger of right thumb M65.311    Closed displaced trimalleolar fracture of right lower leg S82.851A       Past Medical History:        Diagnosis Date    Hyperlipidemia     Hypertension        Past Surgical History:  History reviewed. No pertinent surgical history. Social History:    Social History     Tobacco Use    Smoking status: Never Smoker    Smokeless tobacco: Never Used   Substance Use Topics    Alcohol use: Never                                Counseling given: Not Answered      Vital Signs (Current):   Vitals:    03/15/22 1520 03/17/22 0726   BP:  (!) 157/89   Pulse:  90   Resp:  16   Temp:  98.5 °F (36.9 °C)   TempSrc:  Temporal   SpO2:  98%   Weight: 234 lb (106.1 kg) 230 lb (104.3 kg)   Height: 5' 7\" (1.702 m) 5' 7\" (1.702 m)                                              BP Readings from Last 3 Encounters:   03/17/22 (!) 157/89   03/12/22 (!) 162/102       NPO Status:                                                                                 BMI:   Wt Readings from Last 3 Encounters:   03/17/22 230 lb (104.3 kg)   03/15/22 225 lb (102.1 kg)   09/27/18 225 lb (102.1 kg)     Body mass index is 36.02 kg/m². CBC: No results found for: WBC, RBC, HGB, HCT, MCV, RDW, PLT    CMP: No results found for: NA, K, CL, CO2, BUN, CREATININE, GFRAA, AGRATIO, LABGLOM, GLUCOSE, GLU, PROT, CALCIUM, BILITOT, ALKPHOS, AST, ALT    POC Tests: No results for input(s): POCGLU, POCNA, POCK, POCCL, POCBUN, POCHEMO, POCHCT in the last 72 hours.     Coags: No results found for: PROTIME, INR, APTT    HCG (If Applicable): No results found for: PREGTESTUR, PREGSERUM, HCG, HCGQUANT     ABGs: No results found for: PHART, PO2ART, ORH0EFL, SYQ2LLG, BEART, M3FPRWAE     Type & Screen (If Applicable):  No results found for: LABABO, LABRH    Drug/Infectious Status (If Applicable):  No results found for: HIV, HEPCAB    COVID-19 Screening (If Applicable):   Lab Results   Component Value Date    COVID19 Not Detected 03/17/2022           Anesthesia Evaluation  Patient summary reviewed and Nursing notes reviewed no history of anesthetic complications:   Airway: Mallampati: III  TM distance: >3 FB   Neck ROM: full  Mouth opening: > = 3 FB Dental: normal exam         Pulmonary:Negative Pulmonary ROS breath sounds clear to auscultation                             Cardiovascular:  Exercise tolerance: good (>4 METS),   (+) hypertension:, hyperlipidemia    (-) CABG/stent, dysrhythmias and  angina      Rhythm: regular  Rate: normal                    Neuro/Psych:      (-) seizures, TIA and CVA           GI/Hepatic/Renal:        (-) GERD       Endo/Other:        (-) hypothyroidism, hyperthyroidism               Abdominal:             Vascular:     - DVT and PE. Other Findings:             Anesthesia Plan      general     ASA 2       Induction: intravenous. MIPS: Postoperative opioids intended and Prophylactic antiemetics administered. Anesthetic plan and risks discussed with patient. Plan discussed with CRNA.                   Cassidy Carlson MD   3/17/2022

## 2022-03-17 NOTE — PROGRESS NOTES
Patient arrived from OR to PACU. Oral airway in place. On 15 L simple mask. NSR on the monitor. VSS. drsg to right leg CDI. Skin warm, brisk cap refill, elevated and ice applied.     Electronically signed by Eris Allen RN on 3/17/2022 at 8799

## 2022-03-17 NOTE — PROGRESS NOTES
Teaching / education initiated regarding perioperative experience, expectations, and pain management during stay. Patient and pt's mother Bruna Beal verbalized understanding.

## 2022-03-17 NOTE — PROGRESS NOTES
Pt still on 2-3L NC at this time. Sleeping with RR greater than 10. Pt responds to voice. Sts 'I am just so sleepy.' Vss. Will continue to monitor.

## 2022-03-17 NOTE — PROGRESS NOTES
Report given to Marty Cuevas RN    Electronically signed by Kathleen Moise RN on 3/17/2022 at 12:20 PM

## 2022-03-17 NOTE — ANESTHESIA POSTPROCEDURE EVALUATION
Department of Anesthesiology  Postprocedure Note    Patient: Richard Umana  MRN: [id number]  YOB: 1975  Date of evaluation: 3/17/2022  Time:  1:54 PM     Procedure Summary     Date: 03/17/22 Room / Location: [address]    Anesthesia Start: 8544 Anesthesia Stop: 7771    Procedure: OPEN REDUCTION INTERNAL FIXATION RIGHT ANKLE WITH SYNDESMOSIS REPAIR (10861, 24622)-RADHA (Right Ankle) Diagnosis: (H28.536K  RIGHT TRIMALLEOLAR ANKLE FRACTURE)    Surgeons: Vicenta Cummins MD Responsible Provider: Nishant Falcon MD    Anesthesia Type: general ASA Status: 2          Anesthesia Type: general    Pia Phase I: Pia Score: 8    Pia Phase II:      Last vitals: Reviewed and per EMR flowsheets.        Anesthesia Post Evaluation    Patient location during evaluation: PACU  Patient participation: complete - patient participated  Level of consciousness: awake  Airway patency: patent  Nausea & Vomiting: no vomiting  Complications: no  Cardiovascular status: hemodynamically stable  Respiratory status: acceptable  Hydration status: euvolemic  Multimodal analgesia pain management approach

## 2022-03-17 NOTE — H&P
Preoperative H&P Update    The patient's History and Physical in the medical record from 3/15/2022 was reviewed by me today. Past Medical History:   Diagnosis Date    Hyperlipidemia     Hypertension      History reviewed. No pertinent surgical history. No current facility-administered medications on file prior to encounter. Current Outpatient Medications on File Prior to Encounter   Medication Sig Dispense Refill    acetaminophen (TYLENOL) 160 MG/5ML suspension Take 15 mg/kg by mouth every 4 hours as needed for Fever      fenofibrate (TRIGLIDE) 160 MG tablet TAKE 1 TABLET EVERY DAY WITH FOOD      amLODIPine (NORVASC) 5 MG tablet TAKE 1 TABLET EVERY DAY      loratadine (CLARITIN) 10 MG capsule Take by mouth      ibuprofen (CHILDRENS ADVIL) 100 MG/5ML suspension Take 30 mLs by mouth every 8 hours as needed for Pain 900 mL 0       Allergies   Allergen Reactions    Seasonal       I reviewed the HPI, medications, allergies, reason for surgery, diagnosis and treatment plan and there has been no change. The patient was evaluated by me today. Physical exam findings for this update include:    Vitals:    03/17/22 0726   BP: (!) 157/89   Pulse: 90   Resp: 16   Temp: 98.5 °F (36.9 °C)   SpO2: 98%     Airway is intact  Chest: chest clear, no wheezing, rales, normal symmetric air entry, no tachypnea, retractions or cyanosis  Heart: regular rate and rhythm ; heart sounds normal  Findings on exam of the body region where surgery is to be performed include:  Right ankle trimalleolar fracture.     Electronically signed by Danilo Lim MD on 3/17/2022 at 8:21 AM

## 2022-03-17 NOTE — PROGRESS NOTES
Discharge instructions review with patients mom - Janusz Evans. All home medications have been reviewed, pt v/u. Discharge instructions signed. Pt discharged via wheelchair. Pt discharged with 2 rx and all belongings. Mom taking stable pt home.

## 2022-03-17 NOTE — BRIEF OP NOTE
Brief Postoperative Note      Patient: Gino Zambrano  YOB: 1975  MRN: [id number]    Date of Procedure: 3/17/2022    Pre-Op Diagnosis: S82.851A  RIGHT TRIMALLEOLAR ANKLE FRACTURE, SYNDESMOSIS REPAIR. Post-Op Diagnosis: Same       Procedure(s):  OPEN REDUCTION INTERNAL FIXATION RIGHT ANKLE WITH SYNDESMOSIS REPAIR (47538, 61306)-RADHA    Surgeon(s):  Carolyn Scales MD    Assistant: Aroldo Gleason CNP    Anesthesia: General    Estimated Blood Loss (mL): Minimal    Complications: None    Specimens:   * No specimens in log *    Implants:  Implant Name Type Inv. Item Serial No.  Lot No. LRB No. Used Action   PLATE BNE [id number] 4 H R LAT DST PERIARTC FIBULAR S STL WILLY - [id number]  PLATE BNE [id number] 4 H R LAT DST PERIARTC FIBULAR S STL WILLY  RADHA BIOMET TRAUMA-WD  Right 1 Implanted   SCREW BNE L22MM DIA2.7MM EDMUNDO ANK FT S STL ST NONCANNULATED - [id number]  SCREW BNE L22MM DIA2.7MM EDMUNDO ANK FT S STL ST NONCANNULATED  RADHA BIOMET TRAUMA-WD  Right 1 Implanted   SCREW BNE L16MM DIA3. 5MM HD DIA2.7MM PERIARTC EDMUNDO S STL ST - [id number]  SCREW BNE L16MM DIA3. 5MM HD DIA2.7MM PERIARTC EDMUNDO S STL ST  RADHA BIOMET TRAUMA-WD  Right 1 Implanted   SCREW BNE L18MM DIA2.7MM HEX HD DIA2.5MM CANC BIODUR 108C - [id number]  SCREW BNE L18MM DIA2.7MM HEX HD DIA2.5MM CANC BIODUR 108C  RADHA BIOMET TRAUMA-WD  Right 2 Implanted   SCREW BNE L16MM DIA2.7MM HEX HD DIA2.5MM CANC BIODUR 108C - [id number]  SCREW BNE L16MM DIA2.7MM HEX HD DIA2.5MM CANC BIODUR 108C  RADHA BIOMET TRAUMA-WD  Right 1 Implanted   SCREW BNE L14MM DIA2.7MM HEX HD DIA2.5MM CANC BIODUR 108C - [id number]  SCREW BNE L14MM DIA2.7MM HEX HD DIA2.5MM CANC BIODUR 108C  RADHA BIOMET TRAUMA-WD  Right 1 Implanted   SCREW BNE L14MM DIA3. 5MM HD DIA2.7MM EDMUNDO PERIARTC S STL ST - [id number]  SCREW BNE L14MM DIA3. 5MM HD DIA2.7MM EDMUNDO PERIARTC S STL ST  RADHA BIOMET TRAUMA-WD  Right 2 Implanted   SCREW BONE L70MM D4MM STNDRD CNCLLS SELF TPPNG SELF Autumn JOHNS - [id number] SCREW BONE L70MM D4MM STNDRD CNCLLS SELF TPPNG SELF DRLLNG C  RADHA BIOMET TRAUMA-WD  Right 1 Implanted   SCREW BNE L65MM DIA4MM CANC SELF DRL ST CARMENZA NONLOCKING 1 2 - [id number]  SCREW BNE L65MM DIA4MM CANC SELF DRL ST CARMENZA NONLOCKING 1 2  RADHA BIOMET TRAUMA-WD  Right 1 Implanted   SCREW BNE L50MM DIA3. 5MM HD DIA2. 7MM LNG PERIARTC ST - [id number]  SCREW BNE L50MM DIA3. 5MM HD DIA2. 7MM LNG PERIARTC ST  RADHA BIOMET TRAUMA-WD  Right 1 Implanted         Drains: * No LDAs found *    Findings: Same    Electronically signed by Barb Garcia MD on 3/17/2022 at 3:56 PM

## 2022-03-17 NOTE — PROGRESS NOTES
Oral airway removed at this time    Electronically signed by Guilherme Gay RN on 3/17/2022 at 12:06 PM

## 2022-03-18 ENCOUNTER — TELEPHONE (OUTPATIENT)
Dept: ORTHOPEDIC SURGERY | Age: 47
End: 2022-03-18

## 2022-03-18 DIAGNOSIS — S82.851A CLOSED DISPLACED TRIMALLEOLAR FRACTURE OF RIGHT ANKLE, INITIAL ENCOUNTER: Primary | ICD-10-CM

## 2022-03-18 DIAGNOSIS — Z87.81 STATUS POST ORIF OF FRACTURE OF ANKLE: ICD-10-CM

## 2022-03-18 DIAGNOSIS — Z98.890 STATUS POST ORIF OF FRACTURE OF ANKLE: ICD-10-CM

## 2022-03-18 RX ORDER — AMOXICILLIN 200 MG/5ML
500 POWDER, FOR SUSPENSION ORAL 4 TIMES DAILY
Qty: 250 ML | Refills: 0 | Status: SHIPPED | OUTPATIENT
Start: 2022-03-18 | End: 2022-03-23

## 2022-03-18 NOTE — TELEPHONE ENCOUNTER
Called and spoke to patient's mother. Notified her per SMA patient may take a liquid form. Medication was sent in and pharmacy should call them when its ready. Confirmed pharmacy as [facility]. Patient mother understood.

## 2022-03-18 NOTE — TELEPHONE ENCOUNTER
General Question     Subject: MEDICATION QUESTION  Patient and /or Facility Request: Mehran Massey  Contact Number: [phone]    MOTHER CALLING REGARDING THE MEDICATION THAT HIS SON WAS GIVEN AFTER SURGERY, CEPHALEX  500 MG CAME IN A CAPSULE. MOTHER REQUESTING IF IT CAN BE GIVEN IN A PILL, SO IT CAN BE CRUSH OR A LIQUID FORM. REQUESTING A LIQUID FORM. PATIENT WILL LIKE FOR THE PRESCRIPTION TO GO TO  [facility].     Ivanna Gomez  [phone]

## 2022-03-18 NOTE — OP NOTE
[place] [address]                     [facility], [address]                                OPERATIVE REPORT    PATIENT NAME: Ila Niño                        :        1975  MED REC NO:   [id number]                          ROOM:  ACCOUNT NO:   [de-identified]                           ADMIT DATE: 2022  PROVIDER:     Rachell Cardenas MD    DATE OF PROCEDURE:  2022    PRIMARY CARE PHYSICIAN:  Zeenat Pollack MD    PREOPERATIVE DIAGNOSES:  1. Right ankle severely comminuted trimalleolar displaced ankle  fracture. 2.  Right ankle distal tibiofibular syndesmosis disruption. POSTOPERATIVE DIAGNOSES:  1. Right ankle severely comminuted trimalleolar displaced ankle  fracture. 2.  Right ankle distal tibiofibular syndesmosis disruption. OPERATION PERFORMED:  1. Open treatment of right ankle trimalleolar severely comminuted  fracture with open reduction and internal fixation. 2.  Open treatment of right ankle distal tibiofibular syndesmosis  disruption with syndesmosis screw. SURGEON:  Rachell Cardenas MD    ASSISTANT:  Olvin Tse CNP    ANESTHESIA:  General anesthesia. ESTIMATED BLOOD LOSS:  Minimal.    COMPLICATIONS:  None. TOURNIQUET:  Right upper thigh, 300 mmHg. IMPLANTS USED:  1. Pastora distal fibula locking plate and one 2.7 lag screw for the  lateral malleolus. 2.  Pastora 4.0 partially-threaded cannulated screw x2 for the medial  malleolus. 3.  Pastora 3.5 cortical screw x1 for syndesmosis repair. INDICATIONS:  This is a 20-year-old white male who works in [employer]  sustained a fall inside [employer] as a worker's comp with a right ankle  injury. He was seen initially at [address] [place] [address] where he was found  to have a trimalleolar right ankle fracture. He was seen in office and  we recommended surgical treatment.   All risks, benefits, and  alternatives were discussed with the patient and his family and they  agreed to proceed with the surgical treatment. Given the patient's Body mass index is 36.02 kg/m². severely comminuted fracture added significant challenge to the procedure. It required significant physical and mental effort. It required 100% more time for such procedure. DESCRIPTION OF PROCEDURE:  The patient's right ankle was marked. He  received 2 gm Ancef IV preoperatively. The patient was then brought to  the operating room and underwent general anesthesia. A well-padded  tourniquet was placed in the right upper thigh. The right lower  extremity was then prepped and draped in a regular sterile routine  fashion. A time-out was called confirming the patient name, site, and  procedure. Esmarch was used for exsanguination and tourniquet was inflated to 300  mmHg. A lateral incision was made over the distal fibula. The fracture  was exposed and found to be markedly displaced. There was comminution  to the fracture which added significant challenge to the procedure. We  were carefully able to reduce the fracture starting proximally and  marching distally. We were able to reduce it anatomically in place. Because of the instability of the fracture, we used K-wires for  provisional fixation. While maintaining the reduction, we put a 2.7 lag  screw distally to secure it in place. We then used a Pastora distal  fibula locking plate as a buttress to keep the fracture anatomically in  place. We put one screw in the oblong hole and then we locked it in  place with a total of four distal 2.7 locking screws. We added two more  cortical screws proximally. At this point, we turned our attention  towards the medial side. An incision was made over the medial  malleolus. The fracture was found to be markedly displaced and  comminuted which added significant challenge to the procedure as well. There was a small bony fragment that we removed that was blocking the  reduction.   We then reduced it anatomically in place and we secured with  two K-wires. After we confirmed that the two wires in good position in  both AP and lateral plane, we placed 4.0 partially threaded cannulated  screws with good stabilization on the medial side. The posterior  malleolus was anatomically in place and did not need fixation. At this  point, we turned our attention towards the syndesmosis repair, which was  reduced manually and we placed a 3.5 cortical screw to keep it in good  anatomic position with good repair of the syndesmosis. At this point,  we let the tourniquet down and hemostasis was secured. We irrigated the  incision copiously with normal saline mixed with gentamicin. We then  closed the deep layer with a 2-0 Vicryl, subcu with a 3-0 Vicryl and the  skin with a 4-0 Monocryl. Steri-Strips were then applied. Dressing was  then applied in the form of Xeroform, 4 x 4, sterile Webril, and a  splint was applied. The patient tolerated the procedure well and was taken to the recovery  in stable condition. Dariel Jaramillo CNP was 1st Assist given the nature of the procedure that needed advanced assistance. POSTOPERATIVE PLAN:  The patient will be discharged home. He will be  nonweightbearing for at least six to eight weeks given the amount of  comminution of the fracture. We can start range of motion in two weeks. He will need a staged procedure with syndesmosis screw removal four to  five months postoperatively.         Erlin Nichols MD    D: 03/17/2022 16:04:21       T: 03/18/2022 2:58:07     [email or web address]  Job#: [id number]     Doc#: [id number]    CC:  Manolo Roles

## 2022-03-29 ENCOUNTER — OFFICE VISIT (OUTPATIENT)
Dept: ORTHOPEDIC SURGERY | Age: 47
End: 2022-03-29
Payer: COMMERCIAL

## 2022-03-29 VITALS — HEIGHT: 67 IN | BODY MASS INDEX: 36.02 KG/M2

## 2022-03-29 DIAGNOSIS — S82.851A CLOSED DISPLACED TRIMALLEOLAR FRACTURE OF RIGHT ANKLE, INITIAL ENCOUNTER: Primary | ICD-10-CM

## 2022-03-29 DIAGNOSIS — Z98.890 STATUS POST ORIF OF FRACTURE OF ANKLE: ICD-10-CM

## 2022-03-29 DIAGNOSIS — Z87.81 STATUS POST ORIF OF FRACTURE OF ANKLE: ICD-10-CM

## 2022-03-29 PROCEDURE — 99024 POSTOP FOLLOW-UP VISIT: CPT | Performed by: ORTHOPAEDIC SURGERY

## 2022-03-29 PROCEDURE — L4361 PNEUMA/VAC WALK BOOT PRE OTS: HCPCS | Performed by: ORTHOPAEDIC SURGERY

## 2022-03-30 NOTE — PROGRESS NOTES
DIAGNOSIS:  Right ankle trimalleolus fracture, status post ORIF, with syndesmosis repair. DATE OF SURGERY:  3/17/2022, Worker's Comp. HISTORY OF PRESENT ILLNESS:  Mr. Muller 48 y.o.  male who came in today for 2 weeks postoperative visit. The patient denies any significant pain in the right ankle 5/10. He has been in a splint, and non WB. No numbness or tingling sensation. No fever or Chills. He came with his parents. He works at [employer], located within [employer]. PHYSICAL EXAMINATION:  The incision healing well. No signs of any erythema or drainage, minimal swelling. He has no pain with the active or passive range of motion of the right ankle, but decrease ROM. He has intact sensation distally, and he is neurovascularly intact. IMAGING:  Three views right ankle showed anatomic alignment of the fracture, plate and screws and syndesmosis screw in good position, no loosening. Ankle mortise is well centered. IMPRESSION:  2 weeks out from right ankle trimalleolus ORIF with syndesmosis repair, and doing very well. PLAN: He placed in a boot, and non WB. I have told the patient to work on ROM, The patient will come back for a follow up in 6 weeks. At that time, we will take 3 views of the right ankle standing. He will need a staged procedure with syndesmosis screw removal 4-5 months postop. Procedures    Breg Tall Aleisha Walking Boot     Patient was prescribed a Breg Tall Aleisha Walking Boot. The right ankle will require stabilization / immobilization from this semi-rigid / rigid orthosis to improve their function. The orthosis will assist in protecting the affected area, provide functional support and facilitate healing. Patient was instructed to progress ambulation  as non weight bearing in the device. The plan of care is to progress the patient to full weight bearing status.      The patient was educated and fit by a healthcare professional with expert knowledge and specialization in brace application while under the direct supervision of the physician. Verbal and written instructions for the use of and application of this item were provided. They were instructed to contact the office immediately should the brace result in increased pain, decreased sensation, increased swelling or worsening of the condition.        Terry Porter MD

## 2022-05-10 ENCOUNTER — OFFICE VISIT (OUTPATIENT)
Dept: ORTHOPEDIC SURGERY | Age: 47
End: 2022-05-10

## 2022-05-10 VITALS — HEIGHT: 67 IN | BODY MASS INDEX: 36.02 KG/M2

## 2022-05-10 DIAGNOSIS — Z87.81 STATUS POST ORIF OF FRACTURE OF ANKLE: ICD-10-CM

## 2022-05-10 DIAGNOSIS — S82.851A CLOSED DISPLACED TRIMALLEOLAR FRACTURE OF RIGHT ANKLE, INITIAL ENCOUNTER: Primary | ICD-10-CM

## 2022-05-10 DIAGNOSIS — Z98.890 STATUS POST ORIF OF FRACTURE OF ANKLE: ICD-10-CM

## 2022-05-10 PROCEDURE — 99024 POSTOP FOLLOW-UP VISIT: CPT | Performed by: ORTHOPAEDIC SURGERY

## 2022-05-10 NOTE — LETTER
[address] [place] [address] [facility] [address]  Phone: [phone]  Fax: [phone] Tal Dais MD        May 10, 2022     Patient: Eliel Love   YOB: 1975   Date of Visit: 5/10/2022       To Whom It May Concern: It is my medical opinion that Eilel Love should remain out of work until 5/25/22. When returning on 5/25/22, he is to be on light duty restrictions for 4 weeks. If you have any questions or concerns, please don't hesitate to call.     Sincerely,        Foreign Heard MD

## 2022-05-10 NOTE — PROGRESS NOTES
DIAGNOSIS:  Right ankle trimalleolus fracture, status post ORIF, with syndesmosis repair. DATE OF SURGERY:  3/17/2022, Worker's Comp. HISTORY OF PRESENT ILLNESS:  Mr. Muller 48 y.o.  male who came in today for 2 months postoperative visit. The patient denies any significant pain in the right ankle 5/10. He has been in a boot, and NWB using a knee scooter. No numbness or tingling sensation. No fever or Chills. He came with his parents. He works at [employer]. PHYSICAL EXAMINATION:  The incision healed well. No signs of any erythema or drainage, minimal swelling. He has no pain with the active or passive range of motion of the right ankle, but decrease ROM. He has intact sensation distally, and he is neurovascularly intact. IMAGING:  Three views right ankle showed anatomic alignment of the fracture, plate and screws and syndesmosis screw in good position, no loosening. Ankle mortise is well centered. IMPRESSION:  2 months out from right ankle trimalleolus ORIF with syndesmosis repair, and doing very well. PLAN: He will be WBAT in the boot, and start aggressive ROM and peroneal strengthening exercise. Off the boot in 1-2 weeks. I discussed with the patient that I think that he would really benefit from a course of physical therapy for further strengthening and stretching. An Rx for physical therapy was given to the patient. The patient will come back for a follow up in 6 weeks. At that time, we will take 3 views of the right ankle standing. He will need a staged procedure with syndesmosis screw removal 4-5 months postop.       Chuy Thomas MD

## 2022-05-11 ENCOUNTER — HOSPITAL ENCOUNTER (OUTPATIENT)
Dept: PHYSICAL THERAPY | Age: 47
Setting detail: THERAPIES SERIES
Discharge: HOME | End: 2022-05-11
Payer: COMMERCIAL

## 2022-05-11 PROCEDURE — 97112 NEUROMUSCULAR REEDUCATION: CPT

## 2022-05-11 PROCEDURE — 97016 VASOPNEUMATIC DEVICE THERAPY: CPT

## 2022-05-11 PROCEDURE — 97110 THERAPEUTIC EXERCISES: CPT

## 2022-05-11 PROCEDURE — 97161 PT EVAL LOW COMPLEX 20 MIN: CPT

## 2022-05-11 NOTE — FLOWSHEET NOTE
[address] [facility] and [address]  Phone: [phone]   Fax:     [phone]      Physical Therapy Treatment Note/ Progress Report:     Date:  2022    Patient Name:  Bonita Basilio    :  1975  MRN: [id number]  Restrictions/Precautions:    Medical/Treatment Diagnosis Information:  · Diagnosis: Status post ORIF of fracture of ankle Z98.890 Closed displaced trimalleolar fracture of right ankle S82.851A  · Treatment Diagnosis: Right ankle pain M25. 479  Insurance/Certification information:  PT Insurance Information: [facility]  Physician Information:   Dr. Curly Waterman  Has the plan of care been signed (Y/N):        []  Yes  [x]  No     Date of Patient follow up with Physician: 22    Is this a Progress Report:     []  Yes  [x]  No      If Yes:  Date Range for reporting period:  Beginnin22 ------------ Endin22    Progress report will be due (10 Rx or 30 days whichever is less):      Recertification will be due (POC Duration  / 90 days whichever is less): 22      Visit # Insurance Allowable Auth Required   In Person 1 12 (check auth) []  Yes     []  No    Tele Health 0  []  Yes     []  No    Total 1       FOTO Score: 42.6     Date assessed:  22      Latex Allergy:  [x]NO      []YES  Preferred Language for Healthcare:   [x]English       []other:    Pain level:  0-4 (wt bearing in boot)/10     SUBJECTIVE:  See eval    OBJECTIVE: See eval   Observation:    Test measurements:      RESTRICTIONS/PRECAUTIONS:  3/17/22 s/p right ankle trimalleolus ORIF with syndesmosis repair   WBAT in the boot, and start aggressive ROM and peroneal strengthening exercise. Off the boot in 1-2 weeks.     Exercises/Interventions:   Therapeutic Ex (70076) Sets/sec Reps Notes/CUES HEP   Ankle pumps  10 vc    Long sitting calf 30s 3 vc    Ankle circles  10 vc    Seated ankle DF  10 vc    Foot roller  1 vc    Seated HR  10 vc    Seated TR  10 vc    Side to side wt shift in boot  10 vc                         Pt education 10 min  Reviewed precautions, HEP with gradual progression, goals of PT, not to push through pain with ex, use of RICE principles- pt stated understanding    Manual Intervention ([id number])                                                 NMR re-education (72887)   CUES NEEDED                                                                   Therapeutic Activity (46898)                                          [employer] access code:  [facility]           Therapeutic Exercise and NMR EXR  [x] (81565) Provided verbal/tactile cueing for activities related to strengthening, flexibility, endurance, ROM for improvements in LE, proximal hip, and core control with self care, mobility, lifting, ambulation. [x] (61104) Provided verbal/tactile cueing for activities related to improving balance, coordination, kinesthetic sense, posture, motor skill, proprioception to assist with LE, proximal hip, and core control in self-care, mobility, lifting, ambulation and eccentric single leg control.      NMR and Therapeutic Activities:    [x] (96882 or 47200) Provided verbal/tactile cueing for activities related to improving balance, coordination, kinesthetic sense, posture, motor skill, proprioception and motor activation to allow for proper function of core, proximal hip and LE with self-care and ADLs and functional mobility.   [] (19398) Gait Re-education- Provided training and instruction to the patient for proper LE, core and proximal hip recruitment and positioning and eccentric body weight control with ambulation re-education including up and down stairs     Home Exercise Program:    [x] (23576) Reviewed/Progressed HEP activities related to strengthening, flexibility, endurance, ROM of core, proximal hip and LE for functional self-care, mobility, lifting and ambulation/stair navigation   [] (44449) Reviewed/Progressed HEP activities related to improving balance, coordination, kinesthetic sense, posture, motor skill, proprioception of core, proximal hip and LE for self-care, mobility, lifting, and ambulation/stair navigation      Manual Treatments:  PROM / STM / Oscillations-Mobs:  G-I, II, III, IV (PA's, Inf., Post.)  [] (68550) Provided manual therapy to mobilize LE, proximal hip and/or LS spine soft tissue/joints for the purpose of modulating pain, promoting relaxation, increasing ROM, reducing/eliminating soft tissue swelling/inflammation/restriction, improving soft tissue extensibility and allowing for proper ROM for normal function with self-care, mobility, lifting and ambulation. Modalities:      [] GAME READY (VASO)- for significant edema, swelling, pain control. Charges:  Timed Code Treatment Minutes: 25   Total Treatment Minutes:  50   BWC:  TE TIME:  NMR TIME:  MANUAL TIME:  UNTIMED MINUTES:  Medicare Total:         [x] EVAL (LOW) 64896 (typically 20 minutes face-to-face) 145-2  [] EVAL (MOD) 52171 (typically 30 minutes face-to-face)  [] EVAL (HIGH) 35798 (typically 45 minutes face-to-face)  [] RE-EVAL     [x] EO(20997) x   2-215  [] IONTO  [x] NMR (86784) x   215-225  [x] VASO 225-235  [] Manual (15519) x     [] Other:  [] TA x      [] Mech Traction (47129)  [] ES(attended) (03994)      [] ES (un) (28106):    ASSESSMENT:  See eval    GOALS:     Patient stated goal: Walking I no AD. Therapist goals for Patient:   Short Term Goals: To be achieved in: 2 weeks  1. Independent in HEP and progression per patient tolerance, in order to prevent re-injury. [] Progressing: [] Met: [] Not Met: [] Adjusted     2. Patient will have a decrease in pain to facilitate improvement in movement, function, and ADLs as indicated by Functional Deficits. [] Progressing: [] Met: [] Not Met: [] Adjusted     Long Term Goals: To be achieved in: 12 weeks  1.  FOTO score will match or exceed predicted score to assist with reaching prior level of function. [] Progressing: [] Met: [] Not Met: [] Adjusted     2. Patient will demonstrate increased AROM to 8-10 deg DF to allow for proper joint functioning as indicated by patients Functional Deficits. [] Progressing: [] Met: [] Not Met: [] Adjusted     3. Patient will demonstrate an increase in Strength to good proximal hip strength and control, within 5lb HHD in LE to allow for proper functional mobility as indicated by patients Functional Deficits. [] Progressing: [] Met: [] Not Met: [] Adjusted     4. Patient will return to functional activities including standing/walking 30 mins I with LRAD without increased symptoms or restriction. [] Progressing: [] Met: [] Not Met: [] Adjusted     5. Patient will return to work duties as cleared by MD  (patient specific functional goal)    [] Progressing: [] Met: [] Not Met: [] Adjusted         Overall Progression Towards Functional goals/ Treatment Progress Update:  [] Patient is progressing as expected towards functional goals listed. [] Progression is slowed due to complexities/Impairments listed. [] Progression has been slowed due to co-morbidities.   [x] Plan just implemented, too soon to assess goals progression <30days   [] Goals require adjustment due to lack of progress  [] Patient is not progressing as expected and requires additional follow up with physician  [] Other    Prognosis for POC: [x] Good [] Fair  [] Poor      Patient requires continued skilled intervention: [x] Yes  [] No    Treatment/Activity Tolerance:  [x] Patient able to complete treatment  [] Patient limited by fatigue  [] Patient limited by pain    [] Patient limited by other medical complications  [] Other:     Return to Play: (if applicable)   []  Stage 1: Intro to Strength   []  Stage 2: Return to Run and Strength   []  Stage 3: Return to Jump and Strength   []  Stage 4: Dynamic Strength and Agility   []  Stage 5: Sport Specific Training     []  Ready to Return to Play, Nestor All Above Stages   []  Not Ready for Return to Sports   Comments:                          PLAN: See eval  [] Continue per plan of care [] Alter current plan (see comments above)  [x] Plan of care initiated [] Hold pending MD visit [] Discharge    Electronically signed by:  Lorina Shone, PT    Note: If patient does not return for scheduled/ recommended follow up visits, this note will serve as a discharge from care along with most recent update on progress.

## 2022-05-11 NOTE — PLAN OF CARE
[facility], [facility] [place], [address] [facility] [address]  Phone: [phone]   Fax:     [phone]       Physical Therapy Certification    Dear Rosario Jalloh  ,    We had the pleasure of evaluating the following patient for physical therapy services at [address] [place] [address]. A summary of our findings can be found in the initial assessment below. This includes our plan of care. If you have any questions or concerns regarding these findings, please do not hesitate to contact me at the office phone number checked above. Thank you for the referral.       Physician Signature:_______________________________Date:__________________  By signing above (or electronic signature), therapists plan is approved by physician      Patient: Melody Ruiz   : 1975   MRN: [id number]  Referring Physician:        Evaluation Date: 2022      Medical Diagnosis Information: Dr. Rosario Jalloh  Diagnosis: Status post ORIF of fracture of ankle Z98.890 Closed displaced trimalleolar fracture of right ankle S82.851A   Treatment Diagnosis: Right ankle pain M25. 571                                         Insurance information: PT Insurance Information: [facility]     Precautions/ Contra-indications:   3/17/22 s/pright ankle trimalleolus ORIF with syndesmosis repair   \"WBAT in the boot, and start aggressive ROM and peroneal strengthening exercise. Off the boot in 1-2 weeks. \"  C-SSRS Triggered by Intake questionnaire (Past 2 wk assessment):   [x] No, Questionnaire did not trigger screening.   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 Sx 3/17/22. Relevant Medical History: HTN  FOTO Score:     Pain Scale: 0-4 (WB in boot)/10  Easing factors: rest  Provocative factors: activity    Type: []Constant   []Intermittent  []Radiating []Localized []other:     Numbness/Tingling: denies    Occupation/School: [employer]    Living Status/Prior Level of Function: Independent with ADLs and IADLs. OBJECTIVE:     ROM LEFT RIGHT   HIP Flex     HIP Abd     HIP Ext     HIP IR     HIP ER     Knee ext     Knee Flex     Ankle PF  10-35   Ankle DF  Lacking 10 fr 0   Ankle In  10   Ankle Ev  neutral   Strength  LEFT RIGHT   HIP Flexors     HIP Abductors     HIP Ext     Hip ER     Knee EXT (quad)     Knee Flex (HS)     Ankle DF  3-/5   Ankle PF  3-/5   Ankle Inv  3-/5   Ankle EV  3-/5        Circumference  Mid apex  7 cm prox             Reflexes/Sensation:    [x]Dermatomes/Myotomes intact    [x]Reflexes equal and normal bilaterally   []Other:    Joint mobility:     []Normal    []Hypo   []Hyper    Palpation: denies    Functional Mobility/Transfers: I with transfers    Posture: forward flexed posture    Bandages/Dressings/Incisions: healed    Gait: (include devices/WB status) with rollator and boot, step-to pattern     Orthopedic Special Tests: NT                       [x] Patient history, allergies, meds reviewed. Medical chart reviewed. See intake form. Review Of Systems (ROS):  [x]Performed Review of systems (Integumentary, CardioPulmonary, Neurological) by intake and observation. Intake form has been scanned into medical record. Patient has been instructed to contact their primary care physician regarding ROS issues if not already being addressed at this time.       Co-morbidities/Complexities (which will affect course of rehabilitation):    []None           Arthritic conditions   []Rheumatoid arthritis (M05.9)  []Osteoarthritis (M19.91)   Cardiovascular conditions   [x]Hypertension (I10)  []Hyperlipidemia (E78.5)  []Angina pectoris (I20)  []Atherosclerosis (I70)   Musculoskeletal conditions   []Disc pathology   []Congenital spine pathologies   []Prior surgical intervention  []Osteoporosis (M81.8)  []Osteopenia (M85.8)   Endocrine conditions   []Hypothyroid (E03.9)  []Hyperthyroid Gastrointestinal conditions   []Constipation (V48.91)   Metabolic conditions   []Morbid obesity (E66.01)  []Diabetes type 1(E10.65) or 2 (E11.65)   []Neuropathy (G60.9)     Pulmonary conditions   []Asthma (J45)  []Coughing   []COPD (J44.9)   Psychological Disorders  []Anxiety (F41.9)  []Depression (F32.9)   []Other:   []Other:          Barriers to/and or personal factors that will affect rehab potential:              [x]Age  []Sex              []Motivation/Lack of Motivation                        [x]Co-Morbidities              []Cognitive Function, education/learning barriers              []Environmental, home barriers              [x]profession/work barriers  []past PT/medical experience  []other:  Justification:      Falls Risk Assessment (30 days):    [x] Falls Risk assessed and no intervention required.   [] Falls Risk assessed and Patient requires intervention due to being higher risk   TUG score (>12s at risk):     [] Falls education provided, including       G-Codes:       ASSESSMENT:    Functional Impairments:     []Noted lumbar/proximal hip/LE joint hypomobility   [x]Decreased LE functional ROM   [x]Decreased core/proximal hip strength and neuromuscular control   [x]Decreased LE functional strength   [x]Reduced balance/proprioceptive control   []other:      Functional Activity Limitations (from functional questionnaire and intake)   [x]Reduced ability to tolerate prolonged functional positions   [x]Reduced ability or difficulty with changes of positions or transfers between positions   [x]Reduced ability to maintain good posture and demonstrate good body mechanics with sitting, bending, and lifting   [x]Reduced ability to sleep   [x] Reduced ability or tolerance with driving and/or computer work   [x]Reduced ability to perform lifting, carrying tasks   [x]Reduced ability to squat   [x]Reduced ability to forward bend   [x]Reduced ability to ambulate prolonged functional periods/distances/surfaces   [x]Reduced ability to ascend/descend stairs   [x]Reduced ability to run, hop, cut or jump   []other:    Participation Restrictions   [x]Reduced participation in self care activities   [x]Reduced participation in home management activities   [x]Reduced participation in work activities   []Reduced participation in social activities. []Reduced participation in sport/recreation activities. Classification :    [x]Signs/symptoms consistent with post-surgical status including decreased ROM, strength and function.    []Signs/symptoms consistent with joint sprain/strain   []Signs/symptoms consistent with patella-femoral syndrome   []Signs/symptoms consistent with knee OA/hip OA   []Signs/symptoms consistent with internal derangement of knee/Hip   []Signs/symptoms consistent with functional hip weakness/NMR control      []Signs/symptoms consistent with tendinitis/tendinosis    []signs/symptoms consistent with pathology which may benefit from Dry needling      []other:      Prognosis/Rehab Potential:      []Excellent   [x]Good    []Fair   []Poor    Tolerance of evaluation/treatment:    []Excellent   [x]Good    []Fair   []Poor    Physical Therapy Evaluation Complexity Justification  [x] A history of present problem with:  [] no personal factors and/or comorbidities that impact the plan of care;  [x]1-2 personal factors and/or comorbidities that impact the plan of care  []3 personal factors and/or comorbidities that impact the plan of care  [x] An examination of body systems using standardized tests and measures addressing any of the following: body structures and functions (impairments), activity limitations, and/or participation restrictions;:  [] a total of 1-2 or more elements   [] a total of 3 or more elements   [x] a total of 4 or more elements   [x] A clinical presentation with:  [x] stable and/or uncomplicated characteristics   [] evolving clinical presentation with changing characteristics  [] unstable and unpredictable characteristics;   [x] Clinical decision making of [x] low, [] moderate, [] high complexity using standardized patient assessment instrument and/or measurable assessment of functional outcome. [x] EVAL (LOW) 56241 (typically 20 minutes face-to-face)  [] EVAL (MOD) 97272 (typically 30 minutes face-to-face)  [] EVAL (HIGH) 81165 (typically 45 minutes face-to-face)  [] RE-EVAL     PLAN:   Frequency/Duration:  1-2 days per week for 12 Weeks:  Interventions:  [x]  Therapeutic exercise including: strength training, ROM, for Lower extremity and core   [x]  NMR activation and proprioception for LE, Glutes and Core   [x]  Manual therapy as indicated for LE, Hip and spine to include: Dry Needling/IASTM, STM, PROM, Gr I-IV mobilizations, manipulation. [x] Modalities as needed that may include: thermal agents, E-stim, Biofeedback, US, iontophoresis as indicated  [x] Patient education on joint protection, postural re-education, activity modification, progression of HEP. HEP instruction: Refer to Daniel Celeste access code and exercises on the 1st visit treatment note    GOALS:  Patient stated goal: Walking I no AD. Therapist goals for Patient:   Short Term Goals: To be achieved in: 2 weeks  1. Independent in HEP and progression per patient tolerance, in order to prevent re-injury. [] Progressing: [] Met: [] Not Met: [] Adjusted     2. Patient will have a decrease in pain to facilitate improvement in movement, function, and ADLs as indicated by Functional Deficits. [] Progressing: [] Met: [] Not Met: [] Adjusted     Long Term Goals: To be achieved in: 12 weeks  1.  FOTO score will match or exceed predicted score to assist with reaching prior level of function. [] Progressing: [] Met: [] Not Met: [] Adjusted     2. Patient will demonstrate increased AROM to 8-10 deg DF to allow for proper joint functioning as indicated by patients Functional Deficits. [] Progressing: [] Met: [] Not Met: [] Adjusted     3. Patient will demonstrate an increase in Strength to good proximal hip strength and control, within 5lb HHD in LE to allow for proper functional mobility as indicated by patients Functional Deficits. [] Progressing: [] Met: [] Not Met: [] Adjusted     4. Patient will return to functional activities including standing/walking 30 mins I with LRAD without increased symptoms or restriction. [] Progressing: [] Met: [] Not Met: [] Adjusted     5.  Patient will return to work duties as cleared by MD  (patient specific functional goal)    [] Progressing: [] Met: [] Not Met: [] Adjusted      Electronically signed by:  Bailey Courtney, PT

## 2022-05-17 ENCOUNTER — HOSPITAL ENCOUNTER (OUTPATIENT)
Dept: PHYSICAL THERAPY | Age: 47
Setting detail: THERAPIES SERIES
Discharge: HOME | End: 2022-05-17
Payer: COMMERCIAL

## 2022-05-17 PROCEDURE — 97016 VASOPNEUMATIC DEVICE THERAPY: CPT | Performed by: SPECIALIST/TECHNOLOGIST

## 2022-05-17 PROCEDURE — 97110 THERAPEUTIC EXERCISES: CPT | Performed by: SPECIALIST/TECHNOLOGIST

## 2022-05-17 PROCEDURE — 97112 NEUROMUSCULAR REEDUCATION: CPT | Performed by: SPECIALIST/TECHNOLOGIST

## 2022-05-17 PROCEDURE — 97140 MANUAL THERAPY 1/> REGIONS: CPT | Performed by: SPECIALIST/TECHNOLOGIST

## 2022-05-17 NOTE — FLOWSHEET NOTE
[address]23 [facility] and [address], [address] [place] [address] [address]  Phone: [phone]   Fax:     [phone]      Physical Therapy Treatment Note/ Progress Report:     Date:  2022    Patient Name:  Fara Abernathy    :  1975  MRN: [id number]  Restrictions/Precautions:    Medical/Treatment Diagnosis Information:  · Diagnosis: Status post ORIF of fracture of ankle Z98.890 Closed displaced trimalleolar fracture of right ankle S82.851A  · Treatment Diagnosis: Right ankle pain M25. 366  Insurance/Certification information:  PT Insurance Information: [facility]  Physician Information:   Dr. Rose Alfonso  Has the plan of care been signed (Y/N):        []  Yes  [x]  No     Date of Patient follow up with Physician: 22    Is this a Progress Report:     []  Yes  [x]  No      If Yes:  Date Range for reporting period:  Beginnin22 ------------ Endin22    Progress report will be due (10 Rx or 30 days whichever is less):      Recertification will be due (POC Duration  / 90 days whichever is less): 22      Visit # Insurance Allowable Auth Required   In Person 2 12 (check auth) []  Yes     []  No    Tele Health 0  []  Yes     []  No    Total 2       FOTO Score: 42.6     Date assessed:  22      Latex Allergy:  [x]NO      []YES  Preferred Language for Healthcare:   [x]English       []other:    Pain level:  0-4 (wt bearing in boot)/10     SUBJECTIVE:  Pt notes his ankle feels stiff. OBJECTIVE:    Observation:    Test measurements:      RESTRICTIONS/PRECAUTIONS:  3/17/22 s/p right ankle trimalleolus ORIF with syndesmosis repair   WBAT in the boot, and start aggressive ROM and peroneal strengthening exercise. Off the boot in 1-2 weeks.     Exercises/Interventions:   Therapeutic Ex (02966) Sets/sec Reps Notes/CUES HEP   Ankle pumps  10 vc    Long sitting calf 30s 3 vc    Ankle circles  10 vc    Seated ankle DF  10 vc    Foot roller  1 vc Seated HR  10 vc    Seated TR  10 vc    Side to side wt shift   Fwd weight shift foot out front  Fwd weight shift foot behind  1'  1'  1' vc    Bike  5'     Fitter  1' ea 2 ways           Pt education 10 min  Reviewed precautions, HEP with gradual progression, goals of PT, not to push through pain with ex, use of RICE principles- pt stated understanding    Manual Intervention ([phone])       Massage, PROM, Joint Mobs  10'                                        NMR re-education (30071)   CUES NEEDED                                                                   Therapeutic Activity (80676)                                          ViewRay access code:  [facility]           Therapeutic Exercise and NMR EXR  [x] (70143) Provided verbal/tactile cueing for activities related to strengthening, flexibility, endurance, ROM for improvements in LE, proximal hip, and core control with self care, mobility, lifting, ambulation. [x] (45312) Provided verbal/tactile cueing for activities related to improving balance, coordination, kinesthetic sense, posture, motor skill, proprioception to assist with LE, proximal hip, and core control in self-care, mobility, lifting, ambulation and eccentric single leg control.      NMR and Therapeutic Activities:    [x] (17382 or 81301) Provided verbal/tactile cueing for activities related to improving balance, coordination, kinesthetic sense, posture, motor skill, proprioception and motor activation to allow for proper function of core, proximal hip and LE with self-care and ADLs and functional mobility.   [] (76590) Gait Re-education- Provided training and instruction to the patient for proper LE, core and proximal hip recruitment and positioning and eccentric body weight control with ambulation re-education including up and down stairs     Home Exercise Program:    [x] (30510) Reviewed/Progressed HEP activities related to strengthening, flexibility, endurance, ROM of core, proximal hip and LE for functional self-care, mobility, lifting and ambulation/stair navigation   [] (97506) Reviewed/Progressed HEP activities related to improving balance, coordination, kinesthetic sense, posture, motor skill, proprioception of core, proximal hip and LE for self-care, mobility, lifting, and ambulation/stair navigation      Manual Treatments:  PROM / STM / Oscillations-Mobs:  G-I, II, III, IV (PA's, Inf., Post.)  [] (67987) Provided manual therapy to mobilize LE, proximal hip and/or LS spine soft tissue/joints for the purpose of modulating pain, promoting relaxation, increasing ROM, reducing/eliminating soft tissue swelling/inflammation/restriction, improving soft tissue extensibility and allowing for proper ROM for normal function with self-care, mobility, lifting and ambulation. Modalities:      [] GAME READY (VASO)- for significant edema, swelling, pain control. Charges:  Timed Code Treatment Minutes: 55   Total Treatment Minutes:  65   BWC:  TE TIME:  NMR TIME:  MANUAL TIME:  UNTIMED MINUTES:  Medicare Total:         [] EVAL (LOW) 38290 (typically 20 minutes face-to-face)   [] EVAL (MOD) 00293 (typically 30 minutes face-to-face)  [] EVAL (HIGH) 74490 (typically 45 minutes face-to-face)  [] RE-EVAL     [x] UV(00934) x   145-215  [] IONTO  [x] NMR (50494) x   200-215  [x] VASO 225-235  [x] Manual (13339) x   215-225  [] Other:  [] TA x      [] Mech Traction (39768)  [] ES(attended) (37911)      [] ES (un) (81822):    ASSESSMENT:  Advised mom to bring in his tennis shoe next visit. Spoke to mom about proper HEP. GOALS:     Patient stated goal: Walking I no AD. Therapist goals for Patient:   Short Term Goals: To be achieved in: 2 weeks  1. Independent in HEP and progression per patient tolerance, in order to prevent re-injury. [] Progressing: [] Met: [] Not Met: [] Adjusted     2.  Patient will have a decrease in pain to facilitate improvement in movement, function, and ADLs as indicated by Functional Deficits. [] Progressing: [] Met: [] Not Met: [] Adjusted     Long Term Goals: To be achieved in: 12 weeks  1. FOTO score will match or exceed predicted score to assist with reaching prior level of function. [] Progressing: [] Met: [] Not Met: [] Adjusted     2. Patient will demonstrate increased AROM to 8-10 deg DF to allow for proper joint functioning as indicated by patients Functional Deficits. [] Progressing: [] Met: [] Not Met: [] Adjusted     3. Patient will demonstrate an increase in Strength to good proximal hip strength and control, within 5lb HHD in LE to allow for proper functional mobility as indicated by patients Functional Deficits. [] Progressing: [] Met: [] Not Met: [] Adjusted     4. Patient will return to functional activities including standing/walking 30 mins I with LRAD without increased symptoms or restriction. [] Progressing: [] Met: [] Not Met: [] Adjusted     5. Patient will return to work duties as cleared by MD  (patient specific functional goal)    [] Progressing: [] Met: [] Not Met: [] Adjusted         Overall Progression Towards Functional goals/ Treatment Progress Update:  [] Patient is progressing as expected towards functional goals listed. [] Progression is slowed due to complexities/Impairments listed. [] Progression has been slowed due to co-morbidities.   [x] Plan just implemented, too soon to assess goals progression <30days   [] Goals require adjustment due to lack of progress  [] Patient is not progressing as expected and requires additional follow up with physician  [] Other    Prognosis for POC: [x] Good [] Fair  [] Poor      Patient requires continued skilled intervention: [x] Yes  [] No    Treatment/Activity Tolerance:  [x] Patient able to complete treatment  [] Patient limited by fatigue  [] Patient limited by pain    [] Patient limited by other medical complications  [] Other:     Return to Play: (if applicable)   []  Stage 1: Intro to Strength   []  Stage 2: Return to Run and Strength   []  Stage 3: Return to Jump and Strength   []  Stage 4: Dynamic Strength and Agility   []  Stage 5: Sport Specific Training     []  Ready to Return to Play, Meets All Above Stages   []  Not Ready for Return to Sports   Comments:                          PLAN: See eval  [] Continue per plan of care [] Alter current plan (see comments above)  [x] Plan of care initiated [] Hold pending MD visit [] Discharge    Electronically signed by:  Juan C Godoy PTA    Note: If patient does not return for scheduled/ recommended follow up visits, this note will serve as a discharge from care along with most recent update on progress.

## 2022-05-18 ENCOUNTER — TELEPHONE (OUTPATIENT)
Dept: ORTHOPEDIC SURGERY | Age: 47
End: 2022-05-18

## 2022-05-18 DIAGNOSIS — Z98.890 STATUS POST ORIF OF FRACTURE OF ANKLE: ICD-10-CM

## 2022-05-18 DIAGNOSIS — Z87.81 STATUS POST ORIF OF FRACTURE OF ANKLE: ICD-10-CM

## 2022-05-18 DIAGNOSIS — S82.851A CLOSED DISPLACED TRIMALLEOLAR FRACTURE OF RIGHT ANKLE, INITIAL ENCOUNTER: Primary | ICD-10-CM

## 2022-05-18 NOTE — TELEPHONE ENCOUNTER
Other   [phone]  Pt's Mom asking for Clinic callback per Physical Therapist suggesting additional PT needed.     Clinic please call: [phone]

## 2022-05-18 NOTE — TELEPHONE ENCOUNTER
Spoke to patient's mother. New PT referral is needed to authorize additional visits. PT suggests patient needs an additional 2 weeks of therapy before returning to work. Offered that I would discuss this with SMA and return call later. 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 Previous note given has him returning to light duties on 5/25/22.

## 2022-05-18 NOTE — TELEPHONE ENCOUNTER
Mom aware of additional time off. Letter emailed to her at [email or web address]    Mom aware emails are not secure and clarified she would like it emailed to her.

## 2022-05-18 NOTE — TELEPHONE ENCOUNTER
[facility] advised a new note can be given keeping him off for an additional 2 weeks.

## 2022-05-20 ENCOUNTER — HOSPITAL ENCOUNTER (OUTPATIENT)
Dept: PHYSICAL THERAPY | Age: 47
Setting detail: THERAPIES SERIES
Discharge: HOME | End: 2022-05-20
Payer: COMMERCIAL

## 2022-05-20 PROCEDURE — 97110 THERAPEUTIC EXERCISES: CPT | Performed by: SPECIALIST/TECHNOLOGIST

## 2022-05-20 PROCEDURE — 97016 VASOPNEUMATIC DEVICE THERAPY: CPT | Performed by: SPECIALIST/TECHNOLOGIST

## 2022-05-20 PROCEDURE — 97112 NEUROMUSCULAR REEDUCATION: CPT | Performed by: SPECIALIST/TECHNOLOGIST

## 2022-05-20 PROCEDURE — 97140 MANUAL THERAPY 1/> REGIONS: CPT | Performed by: SPECIALIST/TECHNOLOGIST

## 2022-05-20 NOTE — FLOWSHEET NOTE
723 [facility] and [address]  Phone: [phone]   Fax:     [phone]      Physical Therapy Treatment Note/ Progress Report:     Date:  2022    Patient Name:  Bonita Basilio    :  1975  MRN: [id number]  Restrictions/Precautions:    Medical/Treatment Diagnosis Information:  · Diagnosis: Status post ORIF of fracture of ankle Z98.890 Closed displaced trimalleolar fracture of right ankle S82.851A  · Treatment Diagnosis: Right ankle pain M25. 972  Insurance/Certification information:  PT Insurance Information: [facility]  Physician Information:   Dr. Curly Waterman  Has the plan of care been signed (Y/N):        []  Yes  [x]  No     Date of Patient follow up with Physician: 22    Is this a Progress Report:     []  Yes  [x]  No      If Yes:  Date Range for reporting period:  Beginnin22 ------------ Endin22    Progress report will be due (10 Rx or 30 days whichever is less): 4/26/15     Recertification will be due (POC Duration  / 90 days whichever is less): 22      Visit # Insurance Allowable Auth Required   In Person 3 12 (check auth) []  Yes     []  No    Tele Health 0  []  Yes     []  No    Total 3       FOTO Score: 42.6     Date assessed:  22      Latex Allergy:  [x]NO      []YES  Preferred Language for Healthcare:   [x]English       []other:    Pain level:  0-4 (wt bearing in boot)/10     SUBJECTIVE:  Pt notes his ankle feels stiff. Notes he feels like he moves like an old man. OBJECTIVE:    Observation:    Test measurements:      RESTRICTIONS/PRECAUTIONS:  3/17/22 s/p right ankle trimalleolus ORIF with syndesmosis repair   WBAT in the boot, and start aggressive ROM and peroneal strengthening exercise. Off the boot in 1-2 weeks.     Exercises/Interventions:   Therapeutic Ex (41286) Sets/sec Reps Notes/CUES HEP   Ankle pumps  10 vc    Long sitting calf 30s 3 vc    Ankle circles  10 vc Seated ankle DF  10 vc    Foot roller  1 vc    Seated HR  10 vc    Seated TR  10 vc    Side to side wt shift   Fwd weight shift foot out front  Fwd weight shift foot behind  1'  1'  1' vc    Bike  5'     Fitter  1' ea 2 ways    SB stretch/calf raises  30\"x3/x30     Pt education 10 min  Reviewed precautions, HEP with gradual progression, goals of PT, not to push through pain with ex, use of RICE principles- pt stated understanding    Manual Intervention ([id number])       Massage, PROM, Joint Mobs  10'                                        NMR re-education (87553)   CUES NEEDED                                                                   Therapeutic Activity (07679)                                          Clip Interactive access code:  [facility]           Therapeutic Exercise and NMR EXR  [x] (63826) Provided verbal/tactile cueing for activities related to strengthening, flexibility, endurance, ROM for improvements in LE, proximal hip, and core control with self care, mobility, lifting, ambulation. [x] (07519) Provided verbal/tactile cueing for activities related to improving balance, coordination, kinesthetic sense, posture, motor skill, proprioception to assist with LE, proximal hip, and core control in self-care, mobility, lifting, ambulation and eccentric single leg control.      NMR and Therapeutic Activities:    [x] (64651 or 05517) Provided verbal/tactile cueing for activities related to improving balance, coordination, kinesthetic sense, posture, motor skill, proprioception and motor activation to allow for proper function of core, proximal hip and LE with self-care and ADLs and functional mobility.   [] (27503) Gait Re-education- Provided training and instruction to the patient for proper LE, core and proximal hip recruitment and positioning and eccentric body weight control with ambulation re-education including up and down stairs     Home Exercise Program:    [x] (27266) Reviewed/Progressed HEP activities related to strengthening, flexibility, endurance, ROM of core, proximal hip and LE for functional self-care, mobility, lifting and ambulation/stair navigation   [] (93985) Reviewed/Progressed HEP activities related to improving balance, coordination, kinesthetic sense, posture, motor skill, proprioception of core, proximal hip and LE for self-care, mobility, lifting, and ambulation/stair navigation      Manual Treatments:  PROM / STM / Oscillations-Mobs:  G-I, II, III, IV (PA's, Inf., Post.)  [] (37411) Provided manual therapy to mobilize LE, proximal hip and/or LS spine soft tissue/joints for the purpose of modulating pain, promoting relaxation, increasing ROM, reducing/eliminating soft tissue swelling/inflammation/restriction, improving soft tissue extensibility and allowing for proper ROM for normal function with self-care, mobility, lifting and ambulation. Modalities:      [] GAME READY (VASO)- for significant edema, swelling, pain control. Charges:  Timed Code Treatment Minutes: 55   Total Treatment Minutes:  65   BWC:  TE TIME:  NMR TIME:  MANUAL TIME:  UNTIMED MINUTES:  Medicare Total:         [] EVAL (LOW) 32486 (typically 20 minutes face-to-face)   [] EVAL (MOD) 93520 (typically 30 minutes face-to-face)  [] EVAL (HIGH) 22344 (typically 45 minutes face-to-face)  [] RE-EVAL     [x] GC(70122) x   100-130  [] IONTO  [x] NMR (50681) x   130-145  [x] VASO 155-205  [x] Manual (12626) x   145-155 [] Other:  [] TA x      [] Mech Traction (06077)  [] ES(attended) (39295)      [] ES (un) (04995):    ASSESSMENT:  Advised mom to bring in his tennis shoe next visit. Spoke to mom about proper HEP. GOALS:     Patient stated goal: Walking I no AD. Therapist goals for Patient:   Short Term Goals: To be achieved in: 2 weeks  1. Independent in HEP and progression per patient tolerance, in order to prevent re-injury. [] Progressing: [] Met: [] Not Met: [] Adjusted     2.  Patient will have a decrease in pain to facilitate improvement in movement, function, and ADLs as indicated by Functional Deficits. [] Progressing: [] Met: [] Not Met: [] Adjusted     Long Term Goals: To be achieved in: 12 weeks  1. FOTO score will match or exceed predicted score to assist with reaching prior level of function. [] Progressing: [] Met: [] Not Met: [] Adjusted     2. Patient will demonstrate increased AROM to 8-10 deg DF to allow for proper joint functioning as indicated by patients Functional Deficits. [] Progressing: [] Met: [] Not Met: [] Adjusted     3. Patient will demonstrate an increase in Strength to good proximal hip strength and control, within 5lb HHD in LE to allow for proper functional mobility as indicated by patients Functional Deficits. [] Progressing: [] Met: [] Not Met: [] Adjusted     4. Patient will return to functional activities including standing/walking 30 mins I with LRAD without increased symptoms or restriction. [] Progressing: [] Met: [] Not Met: [] Adjusted     5. Patient will return to work duties as cleared by MD  (patient specific functional goal)    [] Progressing: [] Met: [] Not Met: [] Adjusted         Overall Progression Towards Functional goals/ Treatment Progress Update:  [] Patient is progressing as expected towards functional goals listed. [] Progression is slowed due to complexities/Impairments listed. [] Progression has been slowed due to co-morbidities.   [x] Plan just implemented, too soon to assess goals progression <30days   [] Goals require adjustment due to lack of progress  [] Patient is not progressing as expected and requires additional follow up with physician  [] Other    Prognosis for POC: [x] Good [] Fair  [] Poor      Patient requires continued skilled intervention: [x] Yes  [] No    Treatment/Activity Tolerance:  [x] Patient able to complete treatment  [] Patient limited by fatigue  [] Patient limited by pain    [] Patient limited by other medical complications  [] Other:     Return to Play: (if applicable)   []  Stage 1: Intro to Strength   []  Stage 2: Return to Run and Strength   []  Stage 3: Return to Jump and Strength   []  Stage 4: Dynamic Strength and Agility   []  Stage 5: Sport Specific Training     []  Ready to Return to Play, Meets All Above Stages   []  Not Ready for Return to Sports   Comments:                          PLAN: See eval  [] Continue per plan of care [] Alter current plan (see comments above)  [x] Plan of care initiated [] Hold pending MD visit [] Discharge    Electronically signed by:  To Dennis PTA, MHI, ATC    Note: If patient does not return for scheduled/ recommended follow up visits, this note will serve as a discharge from care along with most recent update on progress.

## 2022-05-23 ENCOUNTER — HOSPITAL ENCOUNTER (OUTPATIENT)
Dept: PHYSICAL THERAPY | Age: 47
Setting detail: THERAPIES SERIES
Discharge: HOME | End: 2022-05-23
Payer: COMMERCIAL

## 2022-05-23 PROCEDURE — 97112 NEUROMUSCULAR REEDUCATION: CPT

## 2022-05-23 PROCEDURE — 97110 THERAPEUTIC EXERCISES: CPT

## 2022-05-23 PROCEDURE — 97016 VASOPNEUMATIC DEVICE THERAPY: CPT

## 2022-05-23 PROCEDURE — 97140 MANUAL THERAPY 1/> REGIONS: CPT

## 2022-05-23 NOTE — FLOWSHEET NOTE
[address] [facility] and [address] [address] [facility], [address] [place] [address] [address]  Phone: [phone]   Fax:     [phone]      Physical Therapy Treatment Note/ Progress Report:     Date:  2022    Patient Name:  Jo Ann Rosa    :  1975  MRN: [id number]  Restrictions/Precautions:    Medical/Treatment Diagnosis Information:  · Diagnosis: Status post ORIF of fracture of ankle Z98.890 Closed displaced trimalleolar fracture of right ankle S82.851A  · Treatment Diagnosis: Right ankle pain M25. 084  Insurance/Certification information:  PT Insurance Information: [id number]  Physician Information:   Dr. Rubin Wright  Has the plan of care been signed (Y/N):        []  Yes  [x]  No     Date of Patient follow up with Physician: 22    Is this a Progress Report:     []  Yes  [x]  No      If Yes:  Date Range for reporting period:  Beginnin22 ------------ Endin22    Progress report will be due (10 Rx or 30 days whichever is less):      Recertification will be due (POC Duration  / 90 days whichever is less): 22      Visit # Insurance Allowable Auth Required   In Person 4 12 (check auth) []  Yes     []  No    Tele Health 0  []  Yes     []  No    Total 4       FOTO Score: 42.6     Date assessed:  22      Latex Allergy:  [x]NO      []YES  Preferred Language for Healthcare:   [x]English       []other:    Pain level:  0-4 (wt bearing in boot)/10     SUBJECTIVE:  Pt stated feels pretty good just stiff, stated has been walking without cane at home. OBJECTIVE:    Observation:    Test measurements:      RESTRICTIONS/PRECAUTIONS:  3/17/22 s/p right ankle trimalleolus ORIF with syndesmosis repair   WBAT in the boot, and start aggressive ROM and peroneal strengthening exercise. Off the boot in 1-2 weeks.     Exercises/Interventions:   Therapeutic Ex (78258) Sets/sec Reps Notes/CUES HEP   Ankle pumps  10 vc    Long sitting calf 30s 3 vc    Ankle circles  10 vc    Seated ankle DF heel slides  10 vc    Foot roller  Seated incline calf stretch black incline   2 1  1' vc    Seated HR 2 10 vc    Seated TR 2 10 vc    Side to side wt shift   Fwd weight shift foot out front (+foam)  Fwd weight shift foot behind  2x1'  2x1'  2x1' vc    Bike  7'     Fitter  1' ea 2 ways    SB stretch/calf raises  Step ups at stair case and stand by assist  30\"x3/x30  4in x10     Pt education 10 min  Reviewed precautions, HEP with gradual progression, goals of PT, not to push through pain with ex, use of RICE principles- pt stated understanding    Manual Intervention ([id number])       Massage, PROM,   10'                                        NMR re-education (16567)   CUES NEEDED                                                                   Therapeutic Activity (96022)                                          [employer] access code:  [facility]           Therapeutic Exercise and NMR EXR  [x] (99537) Provided verbal/tactile cueing for activities related to strengthening, flexibility, endurance, ROM for improvements in LE, proximal hip, and core control with self care, mobility, lifting, ambulation. [x] (14642) Provided verbal/tactile cueing for activities related to improving balance, coordination, kinesthetic sense, posture, motor skill, proprioception to assist with LE, proximal hip, and core control in self-care, mobility, lifting, ambulation and eccentric single leg control.      NMR and Therapeutic Activities:    [x] (52422 or 00498) Provided verbal/tactile cueing for activities related to improving balance, coordination, kinesthetic sense, posture, motor skill, proprioception and motor activation to allow for proper function of core, proximal hip and LE with self-care and ADLs and functional mobility.   [] (50014) Gait Re-education- Provided training and instruction to the patient for proper LE, core and proximal hip recruitment and positioning and eccentric body weight control with ambulation re-education including up and down stairs     Home Exercise Program:    [x] (99113) Reviewed/Progressed HEP activities related to strengthening, flexibility, endurance, ROM of core, proximal hip and LE for functional self-care, mobility, lifting and ambulation/stair navigation   [] (14491) Reviewed/Progressed HEP activities related to improving balance, coordination, kinesthetic sense, posture, motor skill, proprioception of core, proximal hip and LE for self-care, mobility, lifting, and ambulation/stair navigation      Manual Treatments:  PROM / STM / Oscillations-Mobs:  G-I, II, III, IV (PA's, Inf., Post.)  [] (72716) Provided manual therapy to mobilize LE, proximal hip and/or LS spine soft tissue/joints for the purpose of modulating pain, promoting relaxation, increasing ROM, reducing/eliminating soft tissue swelling/inflammation/restriction, improving soft tissue extensibility and allowing for proper ROM for normal function with self-care, mobility, lifting and ambulation. Modalities:      [] GAME READY (VASO)- for significant edema, swelling, pain control. Charges:  Timed Code Treatment Minutes: 55   Total Treatment Minutes:  65   BWC:  TE TIME:  NMR TIME:  MANUAL TIME:  UNTIMED MINUTES:  Medicare Total:         [] EVAL (LOW) 87625 (typically 20 minutes face-to-face)   [] EVAL (MOD) 48551 (typically 30 minutes face-to-face)  [] EVAL (HIGH) 50477 (typically 45 minutes face-to-face)  [] RE-EVAL     [x] LP(24390) x   100-130  [] IONTO  [x] NMR (67981) x   130-145  [x] VASO 155-205  [x] Manual (73759) x   145-155 [] Other:  [] TA x      [] Mech Traction (58426)  [] ES(attended) (74358)      [] ES (un) (77826):    ASSESSMENT:  Pt fatigues with above routine. No increased symptoms reported. Educated to use cane to minimize limp. GOALS:     Patient stated goal: Walking I no AD. Therapist goals for Patient:   Short Term Goals: To be achieved in: 2 weeks  1.  Independent in HEP and progression per patient tolerance, in order to prevent re-injury. [] Progressing: [] Met: [] Not Met: [] Adjusted     2. Patient will have a decrease in pain to facilitate improvement in movement, function, and ADLs as indicated by Functional Deficits. [] Progressing: [] Met: [] Not Met: [] Adjusted     Long Term Goals: To be achieved in: 12 weeks  1. FOTO score will match or exceed predicted score to assist with reaching prior level of function. [] Progressing: [] Met: [] Not Met: [] Adjusted     2. Patient will demonstrate increased AROM to 8-10 deg DF to allow for proper joint functioning as indicated by patients Functional Deficits. [] Progressing: [] Met: [] Not Met: [] Adjusted     3. Patient will demonstrate an increase in Strength to good proximal hip strength and control, within 5lb HHD in LE to allow for proper functional mobility as indicated by patients Functional Deficits. [] Progressing: [] Met: [] Not Met: [] Adjusted     4. Patient will return to functional activities including standing/walking 30 mins I with LRAD without increased symptoms or restriction. [] Progressing: [] Met: [] Not Met: [] Adjusted     5. Patient will return to work duties as cleared by MD  (patient specific functional goal)    [] Progressing: [] Met: [] Not Met: [] Adjusted         Overall Progression Towards Functional goals/ Treatment Progress Update:  [] Patient is progressing as expected towards functional goals listed. [] Progression is slowed due to complexities/Impairments listed. [] Progression has been slowed due to co-morbidities.   [x] Plan just implemented, too soon to assess goals progression <30days   [] Goals require adjustment due to lack of progress  [] Patient is not progressing as expected and requires additional follow up with physician  [] Other    Prognosis for POC: [x] Good [] Fair  [] Poor      Patient requires continued skilled intervention: [x] Yes  [] No    Treatment/Activity Tolerance:  [x] Patient able to complete treatment  [] Patient limited by fatigue  [] Patient limited by pain    [] Patient limited by other medical complications  [] Other:     Return to Play: (if applicable)   []  Stage 1: Intro to Strength   []  Stage 2: Return to Run and Strength   []  Stage 3: Return to Jump and Strength   []  Stage 4: Dynamic Strength and Agility   []  Stage 5: Sport Specific Training     []  Ready to Return to Play, Meets All Above Stages   []  Not Ready for Return to Sports   Comments:                          PLAN: See eval  [x] Continue per plan of care [] Alter current plan (see comments above)  [] Plan of care initiated [] Hold pending MD visit [] Discharge    Electronically signed by:  Nathaniel Prom, PT, DPT    Note: If patient does not return for scheduled/ recommended follow up visits, this note will serve as a discharge from care along with most recent update on progress.

## 2022-05-26 ENCOUNTER — HOSPITAL ENCOUNTER (OUTPATIENT)
Dept: PHYSICAL THERAPY | Age: 47
Setting detail: THERAPIES SERIES
Discharge: HOME | End: 2022-05-26
Payer: COMMERCIAL

## 2022-05-26 PROCEDURE — 97140 MANUAL THERAPY 1/> REGIONS: CPT | Performed by: SPECIALIST/TECHNOLOGIST

## 2022-05-26 PROCEDURE — 97110 THERAPEUTIC EXERCISES: CPT | Performed by: SPECIALIST/TECHNOLOGIST

## 2022-05-26 PROCEDURE — 97016 VASOPNEUMATIC DEVICE THERAPY: CPT | Performed by: SPECIALIST/TECHNOLOGIST

## 2022-05-26 PROCEDURE — 97112 NEUROMUSCULAR REEDUCATION: CPT | Performed by: SPECIALIST/TECHNOLOGIST

## 2022-05-26 NOTE — FLOWSHEET NOTE
[facility] [address] [facility], [address] [place] [address] [address]  Phone: [phone]   Fax:     [phone]      Physical Therapy Treatment Note/ Progress Report:     Date:  2022    Patient Name:  Garret Pal    :  1975  MRN: [id number]  Restrictions/Precautions:    Medical/Treatment Diagnosis Information:  · Diagnosis: Status post ORIF of fracture of ankle Z98.890 Closed displaced trimalleolar fracture of right ankle S82.851A  · Treatment Diagnosis: Right ankle pain M25. 624  Insurance/Certification information:  PT Insurance Information: [id number]  Physician Information:   Dr. Ceola Goldmann  Has the plan of care been signed (Y/N):        []  Yes  [x]  No     Date of Patient follow up with Physician: 22    Is this a Progress Report:     []  Yes  [x]  No      If Yes:  Date Range for reporting period:  Beginnin22 ------------ Endin22    Progress report will be due (10 Rx or 30 days whichever is less):      Recertification will be due (POC Duration  / 90 days whichever is less): 22      Visit # Insurance Allowable Auth Required   In Person 5 12 (check auth) []  Yes     []  No    Tele Health 0  []  Yes     []  No    Total 5       FOTO Score: 42.6     Date assessed:  22      Latex Allergy:  [x]NO      []YES  Preferred Language for Healthcare:   [x]English       []other:    Pain level:  0-4 (wt bearing in boot)/10     SUBJECTIVE:  Pt stated feels pretty good just stiff, stated has been walking without cane at home. OBJECTIVE:    Observation:    Test measurements:      RESTRICTIONS/PRECAUTIONS:  3/17/22 s/p right ankle trimalleolus ORIF with syndesmosis repair   WBAT in the boot, and start aggressive ROM and peroneal strengthening exercise. Off the boot in 1-2 weeks.     Exercises/Interventions:   Therapeutic Ex (68949) Sets/sec Reps Notes/CUES HEP   Ankle pumps  10 vc    Long sitting calf 30s 3 vc    Ankle circles  10 vc    Seated ankle DF heel slides  10 vc    Foot roller  Seated incline calf stretch black incline   2 1  1' vc    Seated HR 2 10 vc    Seated TR 2 10 vc    Side to side wt shift   Fwd weight shift foot out front (+foam)  Fwd weight shift foot behind  2x1'  2x1'  2x1' vc    Bike  7'     Fitter  1' ea 2 ways    SB stretch/calf raises  Step ups at stair case and stand by assist  30\"x3/x30  4in x10     Pt education 10 min  Reviewed precautions, HEP with gradual progression, goals of PT, not to push through pain with ex, use of RICE principles- pt stated understanding    Manual Intervention ([id number])       Massage, PROM,   10'                                        NMR re-education (52605)   CUES NEEDED                                                                   Therapeutic Activity (11242)                                          [employer] access code:  [facility]           Therapeutic Exercise and NMR EXR  [x] (82506) Provided verbal/tactile cueing for activities related to strengthening, flexibility, endurance, ROM for improvements in LE, proximal hip, and core control with self care, mobility, lifting, ambulation. [x] (64672) Provided verbal/tactile cueing for activities related to improving balance, coordination, kinesthetic sense, posture, motor skill, proprioception to assist with LE, proximal hip, and core control in self-care, mobility, lifting, ambulation and eccentric single leg control.      NMR and Therapeutic Activities:    [x] (32649 or 95673) Provided verbal/tactile cueing for activities related to improving balance, coordination, kinesthetic sense, posture, motor skill, proprioception and motor activation to allow for proper function of core, proximal hip and LE with self-care and ADLs and functional mobility.   [] (16217) Gait Re-education- Provided training and instruction to the patient for proper LE, core and proximal hip recruitment and positioning and eccentric body weight control with ambulation re-education including up and down stairs     Home Exercise Program:    [x] (20835) Reviewed/Progressed HEP activities related to strengthening, flexibility, endurance, ROM of core, proximal hip and LE for functional self-care, mobility, lifting and ambulation/stair navigation   [] (20962) Reviewed/Progressed HEP activities related to improving balance, coordination, kinesthetic sense, posture, motor skill, proprioception of core, proximal hip and LE for self-care, mobility, lifting, and ambulation/stair navigation      Manual Treatments:  PROM / STM / Oscillations-Mobs:  G-I, II, III, IV (PA's, Inf., Post.)  [] (30891) Provided manual therapy to mobilize LE, proximal hip and/or LS spine soft tissue/joints for the purpose of modulating pain, promoting relaxation, increasing ROM, reducing/eliminating soft tissue swelling/inflammation/restriction, improving soft tissue extensibility and allowing for proper ROM for normal function with self-care, mobility, lifting and ambulation. Modalities:      [] GAME READY (VASO)- for significant edema, swelling, pain control. Charges:  Timed Code Treatment Minutes: 55   Total Treatment Minutes:  65   BWC:  TE TIME:  NMR TIME:  MANUAL TIME:  UNTIMED MINUTES:  Medicare Total:         [] EVAL (LOW) 36811 (typically 20 minutes face-to-face)   [] EVAL (MOD) 03351 (typically 30 minutes face-to-face)  [] EVAL (HIGH) 69423 (typically 45 minutes face-to-face)  [] RE-EVAL     [x] QR(54482) x   100-130  [] IONTO  [x] NMR (94634) x   130-145  [x] VASO 155-205  [x] Manual (36890) x   145-155 [] Other:  [] TA x      [] Mech Traction (30675)  [] ES(attended) (49919)      [] ES (un) (88705):    ASSESSMENT:  Pt fatigues with above routine. No increased symptoms reported. Educated to use cane to minimize limp. GOALS:     Patient stated goal: Walking I no AD. Therapist goals for Patient:   Short Term Goals: To be achieved in: 2 weeks  1.  Independent in HEP and progression per patient tolerance, in order to prevent re-injury. [] Progressing: [] Met: [] Not Met: [] Adjusted     2. Patient will have a decrease in pain to facilitate improvement in movement, function, and ADLs as indicated by Functional Deficits. [] Progressing: [] Met: [] Not Met: [] Adjusted     Long Term Goals: To be achieved in: 12 weeks  1. FOTO score will match or exceed predicted score to assist with reaching prior level of function. [] Progressing: [] Met: [] Not Met: [] Adjusted     2. Patient will demonstrate increased AROM to 8-10 deg DF to allow for proper joint functioning as indicated by patients Functional Deficits. [] Progressing: [] Met: [] Not Met: [] Adjusted     3. Patient will demonstrate an increase in Strength to good proximal hip strength and control, within 5lb HHD in LE to allow for proper functional mobility as indicated by patients Functional Deficits. [] Progressing: [] Met: [] Not Met: [] Adjusted     4. Patient will return to functional activities including standing/walking 30 mins I with LRAD without increased symptoms or restriction. [] Progressing: [] Met: [] Not Met: [] Adjusted     5. Patient will return to work duties as cleared by MD  (patient specific functional goal)    [] Progressing: [] Met: [] Not Met: [] Adjusted         Overall Progression Towards Functional goals/ Treatment Progress Update:  [] Patient is progressing as expected towards functional goals listed. [] Progression is slowed due to complexities/Impairments listed. [] Progression has been slowed due to co-morbidities.   [x] Plan just implemented, too soon to assess goals progression <30days   [] Goals require adjustment due to lack of progress  [] Patient is not progressing as expected and requires additional follow up with physician  [] Other    Prognosis for POC: [x] Good [] Fair  [] Poor      Patient requires continued skilled intervention: [x] Yes  [] No    Treatment/Activity Tolerance:  [x] Patient able to complete treatment  [] Patient limited by fatigue  [] Patient limited by pain    [] Patient limited by other medical complications  [] Other:     Return to Play: (if applicable)   []  Stage 1: Intro to Strength   []  Stage 2: Return to Run and Strength   []  Stage 3: Return to Jump and Strength   []  Stage 4: Dynamic Strength and Agility   []  Stage 5: Sport Specific Training     []  Ready to Return to Play, Meets All Above Stages   []  Not Ready for Return to Sports   Comments:                          PLAN: See eval  [x] Continue per plan of care [] Alter current plan (see comments above)  [] Plan of care initiated [] Hold pending MD visit [] Discharge    Electronically signed by:  Soco Flaherty PTA, DPT    Note: If patient does not return for scheduled/ recommended follow up visits, this note will serve as a discharge from care along with most recent update on progress.

## 2022-05-31 ENCOUNTER — HOSPITAL ENCOUNTER (OUTPATIENT)
Dept: PHYSICAL THERAPY | Age: 47
Setting detail: THERAPIES SERIES
Discharge: HOME | End: 2022-05-31
Payer: COMMERCIAL

## 2022-05-31 PROCEDURE — 97112 NEUROMUSCULAR REEDUCATION: CPT | Performed by: SPECIALIST/TECHNOLOGIST

## 2022-05-31 PROCEDURE — 97140 MANUAL THERAPY 1/> REGIONS: CPT | Performed by: SPECIALIST/TECHNOLOGIST

## 2022-05-31 PROCEDURE — 97016 VASOPNEUMATIC DEVICE THERAPY: CPT | Performed by: SPECIALIST/TECHNOLOGIST

## 2022-05-31 PROCEDURE — 97110 THERAPEUTIC EXERCISES: CPT | Performed by: SPECIALIST/TECHNOLOGIST

## 2022-05-31 NOTE — FLOWSHEET NOTE
[address] [facility], [address] [place] [address] [address]  Phone: [phone]   Fax:     [phone]      Physical Therapy Treatment Note/ Progress Report:     Date:  2022    Patient Name:  Connie Brown    :  1975  MRN: [id number]  Restrictions/Precautions:    Medical/Treatment Diagnosis Information:  · Diagnosis: Status post ORIF of fracture of ankle Z98.890 Closed displaced trimalleolar fracture of right ankle S82.851A  · Treatment Diagnosis: Right ankle pain M25. 375  Insurance/Certification information:  PT Insurance Information: [id number]  Physician Information:   Dr. Sakina Peralta  Has the plan of care been signed (Y/N):        []  Yes  [x]  No     Date of Patient follow up with Physician: 22    Is this a Progress Report:     []  Yes  [x]  No      If Yes:  Date Range for reporting period:  Beginnin22 ------------ Endin22    Progress report will be due (10 Rx or 30 days whichever is less): 9/84/10     Recertification will be due (POC Duration  / 90 days whichever is less): 22      Visit # Insurance Allowable Auth Required   In Person 6 12 (check auth) []  Yes     []  No    Tele Health 0  []  Yes     []  No    Total 6       FOTO Score: 42.6     Date assessed:  22      Latex Allergy:  [x]NO      []YES  Preferred Language for Healthcare:   [x]English       []other:    Pain level:  0-4 (wt bearing in boot)/10     SUBJECTIVE:  Pt notes he went to graduation over the weekend and walked a lot. His ankle swelled afterwards. OBJECTIVE:    Observation:    Test measurements:      RESTRICTIONS/PRECAUTIONS:  3/17/22 s/p right ankle trimalleolus ORIF with syndesmosis repair   WBAT in the boot, and start aggressive ROM and peroneal strengthening exercise. Off the boot in 1-2 weeks.     Exercises/Interventions:   Therapeutic Ex (37279) Sets/sec Reps Notes/CUES HEP   Ankle pumps  10 vc    Long sitting calf 30s 3 vc    Ankle circles  10 vc    Seated ankle DF heel slides  10 vc    Foot roller  Seated incline calf stretch black incline   2 1  1' vc    Seated HR 2 10 vc    Seated TR 2 10 vc    Side to side wt shift   Fwd weight shift foot out front (+foam)  Fwd weight shift foot behind  2x1'  2x1'  2x1' vc    Bike  7'     Fitter  1' ea 2 ways    SB stretch/calf raises  Step ups at stair case and stand by assist lots of verbal cues to step straight up and not swing his foot out. 30\"x3/x30  4in x10     Pt education 10 min  Reviewed precautions, HEP with gradual progression, goals of PT, not to push through pain with ex, use of RICE principles- pt stated understanding    Manual Intervention ([phone])       Massage, PROM,   10'                                        NMR re-education (19775)   CUES NEEDED                                                                   Therapeutic Activity (32607)                                          [email or web address] access code:  [email or web address]           Therapeutic Exercise and NMR EXR  [x] (47123) Provided verbal/tactile cueing for activities related to strengthening, flexibility, endurance, ROM for improvements in LE, proximal hip, and core control with self care, mobility, lifting, ambulation. [x] (82488) Provided verbal/tactile cueing for activities related to improving balance, coordination, kinesthetic sense, posture, motor skill, proprioception to assist with LE, proximal hip, and core control in self-care, mobility, lifting, ambulation and eccentric single leg control.      NMR and Therapeutic Activities:    [x] (07626 or 44839) Provided verbal/tactile cueing for activities related to improving balance, coordination, kinesthetic sense, posture, motor skill, proprioception and motor activation to allow for proper function of core, proximal hip and LE with self-care and ADLs and functional mobility.   [] (16818) Gait Re-education- Provided training and instruction to the patient for proper LE, core and proximal hip recruitment and positioning and eccentric body weight control with ambulation re-education including up and down stairs     Home Exercise Program:    [x] (20489) Reviewed/Progressed HEP activities related to strengthening, flexibility, endurance, ROM of core, proximal hip and LE for functional self-care, mobility, lifting and ambulation/stair navigation   [] (12477) Reviewed/Progressed HEP activities related to improving balance, coordination, kinesthetic sense, posture, motor skill, proprioception of core, proximal hip and LE for self-care, mobility, lifting, and ambulation/stair navigation      Manual Treatments:  PROM / STM / Oscillations-Mobs:  G-I, II, III, IV (PA's, Inf., Post.)  [] (39778) Provided manual therapy to mobilize LE, proximal hip and/or LS spine soft tissue/joints for the purpose of modulating pain, promoting relaxation, increasing ROM, reducing/eliminating soft tissue swelling/inflammation/restriction, improving soft tissue extensibility and allowing for proper ROM for normal function with self-care, mobility, lifting and ambulation. Modalities:      [] GAME READY (VASO)- for significant edema, swelling, pain control. Charges:  Timed Code Treatment Minutes: 55   Total Treatment Minutes:  65   BWC:  TE TIME:  NMR TIME:  MANUAL TIME:  UNTIMED MINUTES:  Medicare Total:         [] EVAL (LOW) 77600 (typically 20 minutes face-to-face)   [] EVAL (MOD) 98296 (typically 30 minutes face-to-face)  [] EVAL (HIGH) 78967 (typically 45 minutes face-to-face)  [] RE-EVAL     [x] ZV(19958) x   100-130  [] IONTO  [x] NMR (49701) x   130-145  [x] VASO 155-205  [x] Manual (28237) x   145-155 [] Other:  [] TA x      [] Mech Traction (23867)  [] ES(attended) (75112)      [] ES (un) (59151):    ASSESSMENT:  Pt fatigues with above routine. No increased symptoms reported. Educated to use cane to minimize limp. GOALS:     Patient stated goal: Walking I no AD.      Therapist goals for Patient:   Short Term Goals: To be achieved in: 2 weeks  1. Independent in HEP and progression per patient tolerance, in order to prevent re-injury. [] Progressing: [] Met: [] Not Met: [] Adjusted     2. Patient will have a decrease in pain to facilitate improvement in movement, function, and ADLs as indicated by Functional Deficits. [] Progressing: [] Met: [] Not Met: [] Adjusted     Long Term Goals: To be achieved in: 12 weeks  1. FOTO score will match or exceed predicted score to assist with reaching prior level of function. [] Progressing: [] Met: [] Not Met: [] Adjusted     2. Patient will demonstrate increased AROM to 8-10 deg DF to allow for proper joint functioning as indicated by patients Functional Deficits. [] Progressing: [] Met: [] Not Met: [] Adjusted     3. Patient will demonstrate an increase in Strength to good proximal hip strength and control, within 5lb HHD in LE to allow for proper functional mobility as indicated by patients Functional Deficits. [] Progressing: [] Met: [] Not Met: [] Adjusted     4. Patient will return to functional activities including standing/walking 30 mins I with LRAD without increased symptoms or restriction. [] Progressing: [] Met: [] Not Met: [] Adjusted     5. Patient will return to work duties as cleared by MD  (patient specific functional goal)    [] Progressing: [] Met: [] Not Met: [] Adjusted         Overall Progression Towards Functional goals/ Treatment Progress Update:  [] Patient is progressing as expected towards functional goals listed. [] Progression is slowed due to complexities/Impairments listed. [] Progression has been slowed due to co-morbidities.   [x] Plan just implemented, too soon to assess goals progression <30days   [] Goals require adjustment due to lack of progress  [] Patient is not progressing as expected and requires additional follow up with physician  [] Other    Prognosis for POC: [x] Good [] Fair  [] Poor      Patient requires continued skilled intervention: [x] Yes  [] No    Treatment/Activity Tolerance:  [x] Patient able to complete treatment  [] Patient limited by fatigue  [] Patient limited by pain    [] Patient limited by other medical complications  [] Other:     Return to Play: (if applicable)   []  Stage 1: Intro to Strength   []  Stage 2: Return to Run and Strength   []  Stage 3: Return to Jump and Strength   []  Stage 4: Dynamic Strength and Agility   []  Stage 5: Sport Specific Training     []  Ready to Return to Play, Meets All Above Stages   []  Not Ready for Return to Sports   Comments:                          PLAN: See eval  [x] Continue per plan of care [] Alter current plan (see comments above)  [] Plan of care initiated [] Hold pending MD visit [] Discharge    Electronically signed by:  Bessie Powell PTA, DPT    Note: If patient does not return for scheduled/ recommended follow up visits, this note will serve as a discharge from care along with most recent update on progress.

## 2022-06-03 ENCOUNTER — HOSPITAL ENCOUNTER (OUTPATIENT)
Dept: PHYSICAL THERAPY | Age: 47
Setting detail: THERAPIES SERIES
Discharge: HOME | End: 2022-06-03
Payer: COMMERCIAL

## 2022-06-03 PROCEDURE — 97110 THERAPEUTIC EXERCISES: CPT | Performed by: SPECIALIST/TECHNOLOGIST

## 2022-06-03 PROCEDURE — 97140 MANUAL THERAPY 1/> REGIONS: CPT | Performed by: SPECIALIST/TECHNOLOGIST

## 2022-06-03 PROCEDURE — 97016 VASOPNEUMATIC DEVICE THERAPY: CPT | Performed by: SPECIALIST/TECHNOLOGIST

## 2022-06-03 PROCEDURE — 97112 NEUROMUSCULAR REEDUCATION: CPT | Performed by: SPECIALIST/TECHNOLOGIST

## 2022-06-03 NOTE — PROGRESS NOTES
[address]23 [facility] [address], [address] [place] [address] [address]  Phone: [phone]   Fax:     [phone]      Physical Therapy Treatment Note/ Progress Report:     Date:  6/3/2022    Patient Name:  Clemencia Walker    :  1975  MRN: [id number]  Restrictions/Precautions:    Medical/Treatment Diagnosis Information:  · Diagnosis: Status post ORIF of fracture of ankle Z98.890 Closed displaced trimalleolar fracture of right ankle S82.851A  · Treatment Diagnosis: Right ankle pain M25. 282  Insurance/Certification information:  PT Insurance Information: [facility]  Physician Information:   Dr. Liam Peres  Has the plan of care been signed (Y/N):        []  Yes  [x]  No     Date of Patient follow up with Physician: 22    Is this a Progress Report:     []  Yes  [x]  No      If Yes:  Date Range for reporting period:  Beginnin22 ------------ Endin22    Progress report will be due (10 Rx or 30 days whichever is less): 53     Recertification will be due (POC Duration  / 90 days whichever is less): 22      Visit # Insurance Allowable Auth Required   In Person 7 12 (check auth) []  Yes     []  No    Tele Health 0  []  Yes     []  No    Total 7       FOTO Score: 42.6     Date assessed:  22      Latex Allergy:  [x]NO      []YES  Preferred Language for Healthcare:   [x]English       []other:    Pain level:  0-4 (wt bearing in boot)/10     SUBJECTIVE:  Pt notes he has been feeling better. Notes he drove to therapy today. OBJECTIVE:    Observation:    Test measurements:      RESTRICTIONS/PRECAUTIONS:  3/17/22 s/p right ankle trimalleolus ORIF with syndesmosis repair   WBAT in the boot, and start aggressive ROM and peroneal strengthening exercise. Off the boot in 1-2 weeks.     Exercises/Interventions:   Therapeutic Ex (82948) Sets/sec Reps Notes/CUES HEP   Ankle pumps  10 vc    Long sitting calf 30s 3 vc    Ankle circles  10 vc    Seated ankle DF heel slides  10 vc    Foot roller  Seated incline calf stretch black incline   2 1  1' vc    Seated HR 2 10 vc    Seated TR 2 10 vc    Side to side wt shift   Fwd weight shift foot out front (+foam)  Fwd weight shift foot behind  2x1'  2x1'  2x1' vc    Bike  10'     Fitter  1' ea 2 ways    SB stretch/calf raises  Step ups at stair case and stand by assist lots of verbal cues to step straight up and not swing his foot out. Leg press  LUZ MARINA ABD R/L           3  2 30\"x3/x30  8in x30        10  10ea           80#  35#    Pt education 10 min  Reviewed precautions, HEP with gradual progression, goals of PT, not to push through pain with ex, use of RICE principles- pt stated understanding    Manual Intervention (89988)       Massage, PROM,   10'                                        NMR re-education (70325)   CUES NEEDED    Gait training (emphasis on turning his foot in when ambulating)  8'                                                             Therapeutic Activity (19119)                                          [employer] access code:  [facility]           Therapeutic Exercise and NMR EXR  [x] (86024) Provided verbal/tactile cueing for activities related to strengthening, flexibility, endurance, ROM for improvements in LE, proximal hip, and core control with self care, mobility, lifting, ambulation. [x] (01808) Provided verbal/tactile cueing for activities related to improving balance, coordination, kinesthetic sense, posture, motor skill, proprioception to assist with LE, proximal hip, and core control in self-care, mobility, lifting, ambulation and eccentric single leg control. NMR and Therapeutic Activities:    [x] (52056 or 02109) Provided verbal/tactile cueing for activities related to improving balance, coordination, kinesthetic sense, posture, motor skill, proprioception and motor activation to allow for proper function of core, proximal hip and LE with self-care and ADLs and functional mobility. [] (71705) Gait Re-education- Provided training and instruction to the patient for proper LE, core and proximal hip recruitment and positioning and eccentric body weight control with ambulation re-education including up and down stairs     Home Exercise Program:    [x] (50314) Reviewed/Progressed HEP activities related to strengthening, flexibility, endurance, ROM of core, proximal hip and LE for functional self-care, mobility, lifting and ambulation/stair navigation   [] (89599) Reviewed/Progressed HEP activities related to improving balance, coordination, kinesthetic sense, posture, motor skill, proprioception of core, proximal hip and LE for self-care, mobility, lifting, and ambulation/stair navigation      Manual Treatments:  PROM / STM / Oscillations-Mobs:  G-I, II, III, IV (PA's, Inf., Post.)  [] (84087) Provided manual therapy to mobilize LE, proximal hip and/or LS spine soft tissue/joints for the purpose of modulating pain, promoting relaxation, increasing ROM, reducing/eliminating soft tissue swelling/inflammation/restriction, improving soft tissue extensibility and allowing for proper ROM for normal function with self-care, mobility, lifting and ambulation. Modalities:      [] GAME READY (VASO)- for significant edema, swelling, pain control. Charges:  Timed Code Treatment Minutes: 55   Total Treatment Minutes:  65   BWC:  TE TIME:  NMR TIME:  MANUAL TIME:  UNTIMED MINUTES:  Medicare Total:         [] EVAL (LOW) 94457 (typically 20 minutes face-to-face)   [] EVAL (MOD) 17044 (typically 30 minutes face-to-face)  [] EVAL (HIGH) 52870 (typically 45 minutes face-to-face)  [] RE-EVAL     [x] YJ(51628) x   145-215  [] IONTO  [x] NMR (58019) x   215-230  [x] VASO 245-255  [x] Manual (81759) x   230-245 [] Other:  [] TA x      [] Mech Traction (76507)  [] ES(attended) (37028)      [] ES (un) (23969):    ASSESSMENT:  Pt fatigues with above routine. No increased symptoms reported.  Educated to use cane to minimize limp. GOALS:     Patient stated goal: Walking I no AD. Therapist goals for Patient:   Short Term Goals: To be achieved in: 2 weeks  1. Independent in HEP and progression per patient tolerance, in order to prevent re-injury. [] Progressing: [] Met: [] Not Met: [] Adjusted     2. Patient will have a decrease in pain to facilitate improvement in movement, function, and ADLs as indicated by Functional Deficits. [] Progressing: [] Met: [] Not Met: [] Adjusted     Long Term Goals: To be achieved in: 12 weeks  1. FOTO score will match or exceed predicted score to assist with reaching prior level of function. [] Progressing: [] Met: [] Not Met: [] Adjusted     2. Patient will demonstrate increased AROM to 8-10 deg DF to allow for proper joint functioning as indicated by patients Functional Deficits. [] Progressing: [] Met: [] Not Met: [] Adjusted     3. Patient will demonstrate an increase in Strength to good proximal hip strength and control, within 5lb HHD in LE to allow for proper functional mobility as indicated by patients Functional Deficits. [] Progressing: [] Met: [] Not Met: [] Adjusted     4. Patient will return to functional activities including standing/walking 30 mins I with LRAD without increased symptoms or restriction. [] Progressing: [] Met: [] Not Met: [] Adjusted     5. Patient will return to work duties as cleared by MD  (patient specific functional goal)    [] Progressing: [] Met: [] Not Met: [] Adjusted         Overall Progression Towards Functional goals/ Treatment Progress Update:  [] Patient is progressing as expected towards functional goals listed. [] Progression is slowed due to complexities/Impairments listed. [] Progression has been slowed due to co-morbidities.   [x] Plan just implemented, too soon to assess goals progression <30days   [] Goals require adjustment due to lack of progress  [] Patient is not progressing as expected and requires additional follow up with physician  [] Other    Prognosis for POC: [x] Good [] Fair  [] Poor      Patient requires continued skilled intervention: [x] Yes  [] No    Treatment/Activity Tolerance:  [x] Patient able to complete treatment  [] Patient limited by fatigue  [] Patient limited by pain    [] Patient limited by other medical complications  [] Other:     Return to Play: (if applicable)   []  Stage 1: Intro to Strength   []  Stage 2: Return to Run and Strength   []  Stage 3: Return to Jump and Strength   []  Stage 4: Dynamic Strength and Agility   []  Stage 5: Sport Specific Training     []  Ready to Return to Play, Meets All Above Stages   []  Not Ready for Return to Sports   Comments:                          PLAN: See eval  [x] Continue per plan of care [] Alter current plan (see comments above)  [] Plan of care initiated [] Hold pending MD visit [] Discharge    Electronically signed by:  Leon Monte PTA, MHI, ATC    Note: If patient does not return for scheduled/ recommended follow up visits, this note will serve as a discharge from care along with most recent update on progress.

## 2022-06-06 ENCOUNTER — HOSPITAL ENCOUNTER (OUTPATIENT)
Dept: PHYSICAL THERAPY | Age: 47
Setting detail: THERAPIES SERIES
Discharge: HOME | End: 2022-06-06
Payer: COMMERCIAL

## 2022-06-06 ENCOUNTER — TELEPHONE (OUTPATIENT)
Dept: ORTHOPEDIC SURGERY | Age: 47
End: 2022-06-06

## 2022-06-06 PROCEDURE — 97110 THERAPEUTIC EXERCISES: CPT

## 2022-06-06 PROCEDURE — 97140 MANUAL THERAPY 1/> REGIONS: CPT

## 2022-06-06 PROCEDURE — 97112 NEUROMUSCULAR REEDUCATION: CPT

## 2022-06-06 PROCEDURE — 97016 VASOPNEUMATIC DEVICE THERAPY: CPT

## 2022-06-06 NOTE — TELEPHONE ENCOUNTER
Received a call from Michael at [employer]     She is wanting to know when Chrystal Crocker returns to work on 6-8-2022 will it be Modified Duty or Full Duty? Need an updated [employer] 14 completed.

## 2022-06-06 NOTE — FLOWSHEET NOTE
[facility] Rehabilitation[facility] [place] [address] [facility], [address] [place] [address] [address]  Phone: [phone]   Fax:     [phone]      Physical Therapy Treatment Note/ Progress Report:     Date:  2022    Patient Name:  Sean Reza    :  1975  MRN: [id number]  Restrictions/Precautions:    Medical/Treatment Diagnosis Information:  · Diagnosis: Status post ORIF of fracture of ankle Z98.890 Closed displaced trimalleolar fracture of right ankle S82.851A  · Treatment Diagnosis: Right ankle pain M25. 162  Insurance/Certification information:  PT Insurance Information: [id number]  Physician Information:   Dr. Ronnie Ellington  Has the plan of care been signed (Y/N):        []  Yes  [x]  No     Date of Patient follow up with Physician: 22    Is this a Progress Report:     []  Yes  [x]  No      If Yes:  Date Range for reporting period:  Beginnin22 ------------ Endin22    Progress report will be due (10 Rx or 30 days whichever is less): 12     Recertification will be due (POC Duration  / 90 days whichever is less): 22      Visit # Insurance Allowable Auth Required   In Person 8 12 (check auth) []  Yes     []  No    Tele Health 0  []  Yes     []  No    Total 8       FOTO Score: 42.6     Date assessed:  22      Latex Allergy:  [x]NO      []YES  Preferred Language for Healthcare:   [x]English       []other:    Pain level:  0-4 (wt bearing in boot)/10     SUBJECTIVE:  Pt stated feels ok. Arrives without AD, antalgic gait. OBJECTIVE:    Observation:    Test measurements:      RESTRICTIONS/PRECAUTIONS:  3/17/22 s/p right ankle trimalleolus ORIF with syndesmosis repair   WBAT in the boot, and start aggressive ROM and peroneal strengthening exercise. Off the boot in 1-2 weeks.     Exercises/Interventions:   Therapeutic Ex (02001) Sets/sec Reps Notes/CUES HEP   Ankle pumps  vc    Long sitting calf 30s vc    Ankle circles  vc    Seated ankle DF heel slides  vc Foot roller  Seated incline calf stretch black incline   2   1' vc    Seated HR 2 10 vc    Seated TR 2 10 vc    Side to side wt shift   Fwd weight shift foot out front (+foam)  Fwd weight shift foot behind  2x1'  2x1'  2x1' vc    Bike  10'     Fitter  1' ea 2 ways    SB stretch/calf raises  Step ups at stair case and stand by assist lots of verbal cues to step straight up and not swing his foot out. Leg press  LUZ MARINA ABD R/L    Step onto foam           3  2    3 30\"x3/x30  8in x30        10  10ea    10R           80#  35#    Pt education 10 min  Reviewed precautions, HEP with gradual progression, goals of PT, not to push through pain with ex, use of RICE principles- pt stated understanding    Manual Intervention (69069)       Massage, PROM,   10'                                        NMR re-education (92086)   CUES NEEDED    Gait training (emphasis on turning his foot in when ambulating)  8'                                                             Therapeutic Activity (14890)                                          crobo access code:  [facility]           Therapeutic Exercise and NMR EXR  [x] (50429) Provided verbal/tactile cueing for activities related to strengthening, flexibility, endurance, ROM for improvements in LE, proximal hip, and core control with self care, mobility, lifting, ambulation. [x] (36196) Provided verbal/tactile cueing for activities related to improving balance, coordination, kinesthetic sense, posture, motor skill, proprioception to assist with LE, proximal hip, and core control in self-care, mobility, lifting, ambulation and eccentric single leg control. NMR and Therapeutic Activities:    [x] (83411 or 16062) Provided verbal/tactile cueing for activities related to improving balance, coordination, kinesthetic sense, posture, motor skill, proprioception and motor activation to allow for proper function of core, proximal hip and LE with self-care and ADLs and functional mobility. [] (43814) Gait Re-education- Provided training and instruction to the patient for proper LE, core and proximal hip recruitment and positioning and eccentric body weight control with ambulation re-education including up and down stairs     Home Exercise Program:    [x] (80076) Reviewed/Progressed HEP activities related to strengthening, flexibility, endurance, ROM of core, proximal hip and LE for functional self-care, mobility, lifting and ambulation/stair navigation   [] (57643) Reviewed/Progressed HEP activities related to improving balance, coordination, kinesthetic sense, posture, motor skill, proprioception of core, proximal hip and LE for self-care, mobility, lifting, and ambulation/stair navigation      Manual Treatments:  PROM / STM / Oscillations-Mobs:  G-I, II, III, IV (PA's, Inf., Post.)  [] (04176) Provided manual therapy to mobilize LE, proximal hip and/or LS spine soft tissue/joints for the purpose of modulating pain, promoting relaxation, increasing ROM, reducing/eliminating soft tissue swelling/inflammation/restriction, improving soft tissue extensibility and allowing for proper ROM for normal function with self-care, mobility, lifting and ambulation. Modalities:      [] GAME READY (VASO)- for significant edema, swelling, pain control. Charges:  Timed Code Treatment Minutes: 55   Total Treatment Minutes:  65   BWC:  TE TIME:  NMR TIME:  MANUAL TIME:  UNTIMED MINUTES:  Medicare Total:         [] EVAL (LOW) 01453 (typically 20 minutes face-to-face)   [] EVAL (MOD) 53538 (typically 30 minutes face-to-face)  [] EVAL (HIGH) 17421 (typically 45 minutes face-to-face)  [] RE-EVAL     [x] UZ(02132) x 2   145-215  [] IONTO  [x] NMR (53133) x   215-230  [x] VASO 245-255  [x] Manual (03389) x   230-245 [] Other:  [] TA x      [] Mech Traction (56108)  [] ES(attended) (87686)      [] ES (un) (69595):    ASSESSMENT:  Pt fatigues with above routine. No increased symptoms reported.  Educated to use cane to minimize limp. Educated on cues for each exercise throughout session. GOALS:     Patient stated goal: Walking I no AD. Therapist goals for Patient:   Short Term Goals: To be achieved in: 2 weeks  1. Independent in HEP and progression per patient tolerance, in order to prevent re-injury. [] Progressing: [] Met: [] Not Met: [] Adjusted     2. Patient will have a decrease in pain to facilitate improvement in movement, function, and ADLs as indicated by Functional Deficits. [] Progressing: [] Met: [] Not Met: [] Adjusted     Long Term Goals: To be achieved in: 12 weeks  1. FOTO score will match or exceed predicted score to assist with reaching prior level of function. [] Progressing: [] Met: [] Not Met: [] Adjusted     2. Patient will demonstrate increased AROM to 8-10 deg DF to allow for proper joint functioning as indicated by patients Functional Deficits. [] Progressing: [] Met: [] Not Met: [] Adjusted     3. Patient will demonstrate an increase in Strength to good proximal hip strength and control, within 5lb HHD in LE to allow for proper functional mobility as indicated by patients Functional Deficits. [] Progressing: [] Met: [] Not Met: [] Adjusted     4. Patient will return to functional activities including standing/walking 30 mins I with LRAD without increased symptoms or restriction. [] Progressing: [] Met: [] Not Met: [] Adjusted     5. Patient will return to work duties as cleared by MD  (patient specific functional goal)    [] Progressing: [] Met: [] Not Met: [] Adjusted         Overall Progression Towards Functional goals/ Treatment Progress Update:  [] Patient is progressing as expected towards functional goals listed. [] Progression is slowed due to complexities/Impairments listed. [] Progression has been slowed due to co-morbidities.   [x] Plan just implemented, too soon to assess goals progression <30days   [] Goals require adjustment due to lack of progress  [] Patient is not

## 2022-06-06 NOTE — TELEPHONE ENCOUNTER
Cataracts    CARE AFTER SMALL INCISION TECHNIQUE       Dr. Cotto, Dr. Mujica,  Dr. Reese and Dr. Saravia perform state-of-the-art cataract surgery.  In most cases, you will have the new small incision technique performed.  However, because of technical factors and the anatomy of your eye, sometimes it is better to do the more traditional extracapsular technique.  Your doctor can explain this technique to you.  The routine before surgery is the same for both techniques.   However, your instructions and restrictions may be slightly different after surgery.      · You may sleep on either side.  (Remember to wear your eye shield while you sleep for 1 week.)    · Do not swim for 1 week.    · You may resume all other normal activities immediately, or as soon as you feel able.    · Yes!  You can lift anything, bend, bowl, golf, jog, houseclean, mow the lawn, read and watch TV.    EYE DROPS:    Follow your physician's instructions regarding your post-operative eye drops.    Other Instructions:  Bring your eye drops to all your appointments.    If you have any questions or concerns, your doctor would prefer you call rather than worry needlessly.      We are available 24 hours a day by calling [phone].    [address], [address]  [place] [address] [phone]               What to Expect After Cataract Surgery    The following instructions are designed to help prevent injury to your eye after surgery, to help decrease pressure in your eye, and to prevent infection or complications.    Care of your operated eye  · Do not rub your eye; gently wipe the eyelid with a clean tissue.  · Tape your metal shield over your eye whenever you sleep or nap.  Your doctor will tell you when you no longer need it.  · Always wash your hands before and after putting medications into your eye and when applying the metal shield.  · You will be instructed about your eye medications by the nurse in [facility]  medco from [employer] 5/10/22 has been completed and scanned into patient chart or by your doctor the following day in his office.  Take your eye medications and all supplies (if given) with you when you go to the doctor's office tomorrow.    What to expect after eye surgery  · You may have a sticking or scratching sensation in your eye for several days.  · Mild pain is sometimes present for 24 hours. (Plain Tylenol will usually relieve this pain.)  · Double vision is common in the first 24 hours.  · Vision may be blurry for several days to weeks.  This will clear with healing and your new glasses.  · Moderate to severe redness is common; this will disappear.  · Mild crusting on the eye lids may occur for one to seven days.  · You may notice some floaters (black spots in vision).  · Mild eye tearing is common for several weeks after surgery.  · You will be fitted with new eyeglasses seven to eight weeks after surgery.    Call your doctor if any of the following occur:  · Severe pain in the operated eye.  · Large amounts of continuous pus or mucous draining from the operated eye.  A little discharge in the morning is normal.  · Sudden decrease in vision or development of shadows or curtains over vision.    If you have any questions or concerns, your doctor would prefer you call the office rather than worry needlessly.     Care After Anesthesia or Sedation    After Discharge  · Due to the medicine given, someone must drive you home.  If possible, have someone stay with you at home the day of discharge and the night after surgery.  · If you have infants or small children at home, please have someone help you for at least 24 hours after your surgery.  · Do not drive for at least 24 hours after surgery (or as told by your doctor).  · Rest for the remainder of the day. Go up and down stairs slowly.  · Do not smoke after surgery.  Smoking can delay healing.  · Do not operate heavy or potential harmful equipment.  · Do not make legally binding decisions.  · Do no drink alcohol for 24  hours.    Diet  · Nausea may be expected for the first 24 to 48 hours.  Start eating a bland diet (toast, gelatin, 7-up, hot cereal, crackers, sherbet, broth, soup).  · Drink plenty of fluids (6 to 8 glasses of water).  · Resume your regular diet as able.  · Avoid greasy or spicy foods for 24 hours.    Urination  · The effects of anesthetics may cause some people to have trouble passing urine the day of surgery.  Drink a lot of fluids to help prevent this.  · Try to urinate within 12 hours of surgery.  · If you are unable to pass urine and feel like you need to, call your doctor or the hospital.    Pain Control  · If your incision was injected with a long acting local anesthetic, it will wear off in 4 to 6 hours.  You can expect to have some pain at this time.  · Treat your pain with the prescribed pain medicine before it wears off.  Do not wait until your pain becomes severe.    · Ask your nurse when you had your last pain medicine, so you know when you can take another one after you get home.    · Your last pain medicine was given at _________.    Call your doctor if you have:    · Nausea and vomiting that does not stop  · Fever over 101 degrees F.  · Pain not relieved by pain medication  · If you feel you have to pass urine and you are not able to do so.  · Unusual changes in behavior  · Dizziness  · Hives     If you are not able to reach your doctor, you may call the emergency department.

## 2022-06-13 ENCOUNTER — HOSPITAL ENCOUNTER (OUTPATIENT)
Dept: PHYSICAL THERAPY | Age: 47
Setting detail: THERAPIES SERIES
Discharge: HOME | End: 2022-06-13
Payer: COMMERCIAL

## 2022-06-13 PROCEDURE — 97110 THERAPEUTIC EXERCISES: CPT

## 2022-06-13 PROCEDURE — 97016 VASOPNEUMATIC DEVICE THERAPY: CPT

## 2022-06-13 PROCEDURE — 97112 NEUROMUSCULAR REEDUCATION: CPT

## 2022-06-13 PROCEDURE — 97530 THERAPEUTIC ACTIVITIES: CPT

## 2022-06-13 NOTE — FLOWSHEET NOTE
[facility]23 [facility] and [address], [address] [place] [address] [address]  Phone: [phone]   Fax:     [phone]      Physical Therapy Treatment Note/ Progress Report:     Date:  2022    Patient Name:  Elham Jefferson    :  1975  MRN: [id number]  Restrictions/Precautions:    Medical/Treatment Diagnosis Information:  · Diagnosis: Status post ORIF of fracture of ankle Z98.890 Closed displaced trimalleolar fracture of right ankle S82.851A  · Treatment Diagnosis: Right ankle pain M25. 026  Insurance/Certification information:  PT Insurance Information: [facility]  Physician Information:   Dr. Natividad Man  Has the plan of care been signed (Y/N):        []  Yes  [x]  No     Date of Patient follow up with Physician: 22    Is this a Progress Report:     []  Yes  [x]  No      If Yes:  Date Range for reporting period:  Beginnin22 ------------ Endin22    Progress report will be due (10 Rx or 30 days whichever is less):      Recertification will be due (POC Duration  / 90 days whichever is less): 22      Visit # Insurance Allowable Auth Required   In Person 9 12 (check auth) []  Yes     []  No    Tele Health 0  []  Yes     []  No    Total 9       FOTO Score: 42.6     Date assessed:  22      Latex Allergy:  [x]NO      []YES  Preferred Language for Healthcare:   [x]English       []other:    Pain level:  0-4 /10     SUBJECTIVE:  Pt stated has returned to work, continues to walk with antalgic gait. OBJECTIVE:    Observation:    Test measurements:      RESTRICTIONS/PRECAUTIONS:  3/17/22 s/p right ankle trimalleolus ORIF with syndesmosis repair   WBAT in the boot, and start aggressive ROM and peroneal strengthening exercise. Off the boot in 1-2 weeks.     Exercises/Interventions:   Therapeutic Ex (50106) Sets/sec Reps Notes/CUES HEP   Ankle pumps  vc    Long sitting calf 30s vc    Ankle circles  vc    Seated ankle DF heel slides  vc Foot roller  Seated incline calf stretch black incline   2   1' vc    Seated HR 2 10 vc    Seated TR 2 10 vc    Side to side wt shift   Fwd weight shift foot out front (+foam)  Fwd weight shift foot behind  2x1'  2x1'  2x1' vc    Bike  10'     Fitter  1' ea 2 ways    SB stretch/calf raises  Step ups at stair case and stand by assist lots of verbal cues to step straight up and not swing his foot out. Side step ups  Leg press  LUZ MARINA ABD R/L    Step onto foam             3  2    3 2 min/x30  8in x30        8 in/30  10  10ea    10R            70#  35#    Pt education 10 min  Reviewed precautions, HEP with gradual progression, goals of PT, not to push through pain with ex, use of RICE principles- pt stated understanding    Manual Intervention (24210)       Massage, PROM,   10'                                        NMR re-education (11451)   CUES NEEDED    Gait training (emphasis on turning his foot in when ambulating)  8'                                                             Therapeutic Activity (82608)                                          [place] access code:  [facility]           Therapeutic Exercise and NMR EXR  [x] (10982) Provided verbal/tactile cueing for activities related to strengthening, flexibility, endurance, ROM for improvements in LE, proximal hip, and core control with self care, mobility, lifting, ambulation. [x] (43005) Provided verbal/tactile cueing for activities related to improving balance, coordination, kinesthetic sense, posture, motor skill, proprioception to assist with LE, proximal hip, and core control in self-care, mobility, lifting, ambulation and eccentric single leg control.      NMR and Therapeutic Activities:    [x] (90127 or 84379) Provided verbal/tactile cueing for activities related to improving balance, coordination, kinesthetic sense, posture, motor skill, proprioception and motor activation to allow for proper function of core, proximal hip and LE with self-care and ADLs and functional mobility.   [] (11467) Gait Re-education- Provided training and instruction to the patient for proper LE, core and proximal hip recruitment and positioning and eccentric body weight control with ambulation re-education including up and down stairs     Home Exercise Program:    [x] (04746) Reviewed/Progressed HEP activities related to strengthening, flexibility, endurance, ROM of core, proximal hip and LE for functional self-care, mobility, lifting and ambulation/stair navigation   [] (41119) Reviewed/Progressed HEP activities related to improving balance, coordination, kinesthetic sense, posture, motor skill, proprioception of core, proximal hip and LE for self-care, mobility, lifting, and ambulation/stair navigation      Manual Treatments:  PROM / STM / Oscillations-Mobs:  G-I, II, III, IV (PA's, Inf., Post.)  [] (13108) Provided manual therapy to mobilize LE, proximal hip and/or LS spine soft tissue/joints for the purpose of modulating pain, promoting relaxation, increasing ROM, reducing/eliminating soft tissue swelling/inflammation/restriction, improving soft tissue extensibility and allowing for proper ROM for normal function with self-care, mobility, lifting and ambulation. Modalities:      [x] GAME READY (VASO)- for significant edema, swelling, pain control. Charges:  Timed Code Treatment Minutes: 55   Total Treatment Minutes:  65   BWC:  TE TIME:  NMR TIME:  MANUAL TIME:  UNTIMED MINUTES:  Medicare Total:         [] EVAL (LOW) 81515 (typically 20 minutes face-to-face)   [] EVAL (MOD) 46438 (typically 30 minutes face-to-face)  [] EVAL (HIGH) 70514 (typically 45 minutes face-to-face)  [] RE-EVAL     [x] AO(95575) x 2   445-515  [] IONTO  [x] NMR (14325) x   430-445  [x] VASO 525-535  [] Manual (39963) x    [] Other:  [x] TA x   515-525   [] Mech Traction (78770)  [] ES(attended) (97759)      [] ES (un) (74522):    ASSESSMENT:  Pt fatigues with above routine.  No increased symptoms reported. Educated to use cane to minimize limp. Educated on cues for each exercise throughout session. GOALS:     Patient stated goal: Walking I no AD. Therapist goals for Patient:   Short Term Goals: To be achieved in: 2 weeks  1. Independent in HEP and progression per patient tolerance, in order to prevent re-injury. [] Progressing: [] Met: [] Not Met: [] Adjusted     2. Patient will have a decrease in pain to facilitate improvement in movement, function, and ADLs as indicated by Functional Deficits. [] Progressing: [] Met: [] Not Met: [] Adjusted     Long Term Goals: To be achieved in: 12 weeks  1. FOTO score will match or exceed predicted score to assist with reaching prior level of function. [] Progressing: [] Met: [] Not Met: [] Adjusted     2. Patient will demonstrate increased AROM to 8-10 deg DF to allow for proper joint functioning as indicated by patients Functional Deficits. [] Progressing: [] Met: [] Not Met: [] Adjusted     3. Patient will demonstrate an increase in Strength to good proximal hip strength and control, within 5lb HHD in LE to allow for proper functional mobility as indicated by patients Functional Deficits. [] Progressing: [] Met: [] Not Met: [] Adjusted     4. Patient will return to functional activities including standing/walking 30 mins I with LRAD without increased symptoms or restriction. [] Progressing: [] Met: [] Not Met: [] Adjusted     5. Patient will return to work duties as cleared by MD  (patient specific functional goal)    [] Progressing: [] Met: [] Not Met: [] Adjusted         Overall Progression Towards Functional goals/ Treatment Progress Update:  [] Patient is progressing as expected towards functional goals listed. [] Progression is slowed due to complexities/Impairments listed. [] Progression has been slowed due to co-morbidities.   [x] Plan just implemented, too soon to assess goals progression <30days   [] Goals require adjustment due to lack of progress  [] Patient is not progressing as expected and requires additional follow up with physician  [] Other    Prognosis for POC: [x] Good [] Fair  [] Poor      Patient requires continued skilled intervention: [x] Yes  [] No    Treatment/Activity Tolerance:  [x] Patient able to complete treatment  [] Patient limited by fatigue  [] Patient limited by pain    [] Patient limited by other medical complications  [] Other:     Return to Play: (if applicable)   []  Stage 1: Intro to Strength   []  Stage 2: Return to Run and Strength   []  Stage 3: Return to Jump and Strength   []  Stage 4: Dynamic Strength and Agility   []  Stage 5: Sport Specific Training     []  Ready to Return to Play, Meets All Above Stages   []  Not Ready for Return to Sports   Comments:                          PLAN: See eval  [x] Continue per plan of care [] Alter current plan (see comments above)  [] Plan of care initiated [] Hold pending MD visit [] Discharge    Electronically signed by:  Nalini Jasso PT, DPT    Note: If patient does not return for scheduled/ recommended follow up visits, this note will serve as a discharge from care along with most recent update on progress.

## 2022-06-15 ENCOUNTER — HOSPITAL ENCOUNTER (OUTPATIENT)
Dept: PHYSICAL THERAPY | Age: 47
Setting detail: THERAPIES SERIES
Discharge: HOME | End: 2022-06-15
Payer: COMMERCIAL

## 2022-06-15 NOTE — PROGRESS NOTES
723 [facility], [address] [place] [address], [address] [place] [address] [address]  Phone: [phone]   Fax:     [phone]    Physical Therapy  Cancellation/No-show Note  Patient Name:  Elzie Lanes  :  1975   Date:  6/15/2022    Cancelled visits to date: 1  No-shows to date: 0    For today's appointment patient:  [x]  Cancelled  []  Rescheduled appointment  []  No-show     Reason given by patient:  []  Patient ill  []  Conflicting appointment  []  No transportation    []  Conflict with work  []  No reason given  [x]  Other:     Comments:   No authorization    Phone call information:   []  Phone call made today to patient at am/pm at the number provided:      []  Patient answered, conversation as follows:    []  Patient did not answer, message left as follows:  [x]  Phone call not needed - pt contacted us to cancel and provided reason for cancellation.      Electronically signed by:  Leona Reza PTA, MHI, ATC

## 2022-06-23 ENCOUNTER — TELEPHONE (OUTPATIENT)
Dept: ORTHOPEDIC SURGERY | Age: 47
End: 2022-06-23

## 2022-06-23 ENCOUNTER — HOSPITAL ENCOUNTER (OUTPATIENT)
Dept: PHYSICAL THERAPY | Age: 47
Setting detail: THERAPIES SERIES
Discharge: HOME | End: 2022-06-23
Payer: COMMERCIAL

## 2022-06-23 PROCEDURE — 97016 VASOPNEUMATIC DEVICE THERAPY: CPT | Performed by: SPECIALIST/TECHNOLOGIST

## 2022-06-23 PROCEDURE — 97110 THERAPEUTIC EXERCISES: CPT | Performed by: SPECIALIST/TECHNOLOGIST

## 2022-06-23 PROCEDURE — 97112 NEUROMUSCULAR REEDUCATION: CPT | Performed by: SPECIALIST/TECHNOLOGIST

## 2022-06-23 PROCEDURE — 97530 THERAPEUTIC ACTIVITIES: CPT | Performed by: SPECIALIST/TECHNOLOGIST

## 2022-06-23 NOTE — PROGRESS NOTES
[address]23 [facility] and [address] [address] [facility], [address] [place] [address] [address]  Phone: [phone]   Fax:     [phone]      Physical Therapy Treatment Note/ Progress Report:     Date:  2022    Patient Name:  Rosana Frederick    :  1975  MRN: [id number]  Restrictions/Precautions:    Medical/Treatment Diagnosis Information:  · Diagnosis: Status post ORIF of fracture of ankle Z98.890 Closed displaced trimalleolar fracture of right ankle S82.851A  · Treatment Diagnosis: Right ankle pain M25. 124  Insurance/Certification information:  PT Insurance Information: [id number]  Physician Information:   Dr. Noris Goodman  Has the plan of care been signed (Y/N):        []  Yes  [x]  No     Date of Patient follow up with Physician: 22    Is this a Progress Report:     []  Yes  [x]  No      If Yes:  Date Range for reporting period:  Beginnin22 ------------ Endin22    Progress report will be due (10 Rx or 30 days whichever is less):      Recertification will be due (POC Duration  / 90 days whichever is less): 22      Visit # Insurance Allowable Auth Required   In Person 9    1 12 (check auth)    8 visits 22-22 []  Yes     []  No    Tele Health 0  []  Yes     []  No    Total 10       FOTO Score: 42.6     Date assessed:  22  Next Visit     Latex Allergy:  [x]NO      []YES  Preferred Language for Healthcare:   [x]English       []other:    Pain level:  0-4 /10     SUBJECTIVE:  Pt notes that his foot feels stiff. OBJECTIVE:    Observation:    Test measurements:      RESTRICTIONS/PRECAUTIONS:  3/17/22 s/p right ankle trimalleolus ORIF with syndesmosis repair   WBAT in the boot, and start aggressive ROM and peroneal strengthening exercise. Off the boot in 1-2 weeks.     Exercises/Interventions:   Therapeutic Ex (24605) Sets/sec Reps Notes/CUES HEP   Ankle pumps  vc    Long sitting calf 30s vc    Ankle circles  vc    Seated ankle DF heel slides  vc    Foot roller  Seated incline calf stretch black incline   2   1' vc    Seated HR 2 10 vc    Seated TR 2 10 vc    Side to side wt shift   Fwd weight shift foot out front (+foam)  Fwd weight shift foot behind  2x1'  2x1'  2x1' vc    Bike  10'     Fitter  1' ea 2 ways    SB stretch/calf raises  Step ups at stair case and stand by assist lots of verbal cues to step straight up and not swing his foot out. Side step ups  Leg press  LUZ MARINA ABD R/L    Step onto foam             3  2    3 2 min/x30  8in x30        8 in/30  10  10ea    10R            70#  35#    Pt education 10 min  Reviewed precautions, HEP with gradual progression, goals of PT, not to push through pain with ex, use of RICE principles- pt stated understanding    Manual Intervention (57090)       Massage, PROM,   10'                                        NMR re-education (41518)   CUES NEEDED    Gait training (emphasis on turning his foot in when ambulating)  8'                                                             Therapeutic Activity (46829)                                          [employer] access code:  [facility]           Therapeutic Exercise and NMR EXR  [x] (43998) Provided verbal/tactile cueing for activities related to strengthening, flexibility, endurance, ROM for improvements in LE, proximal hip, and core control with self care, mobility, lifting, ambulation. [x] (42694) Provided verbal/tactile cueing for activities related to improving balance, coordination, kinesthetic sense, posture, motor skill, proprioception to assist with LE, proximal hip, and core control in self-care, mobility, lifting, ambulation and eccentric single leg control.      NMR and Therapeutic Activities:    [x] (90842 or 20757) Provided verbal/tactile cueing for activities related to improving balance, coordination, kinesthetic sense, posture, motor skill, proprioception and motor activation to allow for proper function of core, proximal hip and LE with increased symptoms reported. Educated to use cane to minimize limp. Educated on cues for each exercise throughout session. GOALS:     Patient stated goal: Walking I no AD. Therapist goals for Patient:   Short Term Goals: To be achieved in: 2 weeks  1. Independent in HEP and progression per patient tolerance, in order to prevent re-injury. [] Progressing: [] Met: [] Not Met: [] Adjusted     2. Patient will have a decrease in pain to facilitate improvement in movement, function, and ADLs as indicated by Functional Deficits. [] Progressing: [] Met: [] Not Met: [] Adjusted     Long Term Goals: To be achieved in: 12 weeks  1. FOTO score will match or exceed predicted score to assist with reaching prior level of function. [] Progressing: [] Met: [] Not Met: [] Adjusted     2. Patient will demonstrate increased AROM to 8-10 deg DF to allow for proper joint functioning as indicated by patients Functional Deficits. [] Progressing: [] Met: [] Not Met: [] Adjusted     3. Patient will demonstrate an increase in Strength to good proximal hip strength and control, within 5lb HHD in LE to allow for proper functional mobility as indicated by patients Functional Deficits. [] Progressing: [] Met: [] Not Met: [] Adjusted     4. Patient will return to functional activities including standing/walking 30 mins I with LRAD without increased symptoms or restriction. [] Progressing: [] Met: [] Not Met: [] Adjusted     5. Patient will return to work duties as cleared by MD  (patient specific functional goal)    [] Progressing: [] Met: [] Not Met: [] Adjusted         Overall Progression Towards Functional goals/ Treatment Progress Update:  [] Patient is progressing as expected towards functional goals listed. [] Progression is slowed due to complexities/Impairments listed. [] Progression has been slowed due to co-morbidities.   [x] Plan just implemented, too soon to assess goals progression <30days   [] Goals require adjustment due to lack of progress  [] Patient is not progressing as expected and requires additional follow up with physician  [] Other    Prognosis for POC: [x] Good [] Fair  [] Poor      Patient requires continued skilled intervention: [x] Yes  [] No    Treatment/Activity Tolerance:  [x] Patient able to complete treatment  [] Patient limited by fatigue  [] Patient limited by pain    [] Patient limited by other medical complications  [] Other:     Return to Play: (if applicable)   []  Stage 1: Intro to Strength   []  Stage 2: Return to Run and Strength   []  Stage 3: Return to Jump and Strength   []  Stage 4: Dynamic Strength and Agility   []  Stage 5: Sport Specific Training     []  Ready to Return to Play, Meets All Above Stages   []  Not Ready for Return to Sports   Comments:                          PLAN: See eval  [x] Continue per plan of care [] Alter current plan (see comments above)  [] Plan of care initiated [] Hold pending MD visit [] Discharge    Electronically signed by:  Bigg Rubin PTA, MHI, ATC    Note: If patient does not return for scheduled/ recommended follow up visits, this note will serve as a discharge from care along with most recent update on progress.

## 2022-06-23 NOTE — TELEPHONE ENCOUNTER
Spoke to patient's father to notify them that additional physical therapy was approved. They were already aware of the 8 additional visits. No further action needed.

## 2022-06-28 ENCOUNTER — OFFICE VISIT (OUTPATIENT)
Dept: ORTHOPEDIC SURGERY | Age: 47
End: 2022-06-28
Payer: COMMERCIAL

## 2022-06-28 VITALS — BODY MASS INDEX: 36.1 KG/M2 | HEIGHT: 67 IN | WEIGHT: 230 LBS

## 2022-06-28 DIAGNOSIS — S82.851A CLOSED DISPLACED TRIMALLEOLAR FRACTURE OF RIGHT ANKLE, INITIAL ENCOUNTER: Primary | ICD-10-CM

## 2022-06-28 DIAGNOSIS — Z87.81 STATUS POST ORIF OF FRACTURE OF ANKLE: ICD-10-CM

## 2022-06-28 DIAGNOSIS — Z98.890 STATUS POST ORIF OF FRACTURE OF ANKLE: ICD-10-CM

## 2022-06-28 PROCEDURE — 99213 OFFICE O/P EST LOW 20 MIN: CPT | Performed by: ORTHOPAEDIC SURGERY

## 2022-06-29 ENCOUNTER — TELEPHONE (OUTPATIENT)
Dept: ORTHOPEDIC SURGERY | Age: 47
End: 2022-06-29

## 2022-06-29 NOTE — TELEPHONE ENCOUNTER
General Question     Subject: RETURN BACK TO WORK FULL DUTY  Patient and /or Facility Request: Lelo Rodriguez  Contact Number: [phone]    MOTHER,  MARKO CALLING REGARDING SON WAS IN TO SEE DR. LEAL ON YESTERDAY, June 29, 2022. 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 MOTHER WOULD LIKE FOR THE NOTE TO BE EMAIL AT: [email or web address]    PLEASE CALL BACK AT THE ABOVE NUMBER WITH ANY QUESTIONS OR CONCERNS.

## 2022-06-30 ENCOUNTER — HOSPITAL ENCOUNTER (OUTPATIENT)
Dept: PHYSICAL THERAPY | Age: 47
Setting detail: THERAPIES SERIES
Discharge: HOME | End: 2022-06-30
Payer: COMMERCIAL

## 2022-06-30 PROCEDURE — 97112 NEUROMUSCULAR REEDUCATION: CPT | Performed by: SPECIALIST/TECHNOLOGIST

## 2022-06-30 PROCEDURE — 97016 VASOPNEUMATIC DEVICE THERAPY: CPT | Performed by: SPECIALIST/TECHNOLOGIST

## 2022-06-30 PROCEDURE — 97110 THERAPEUTIC EXERCISES: CPT | Performed by: SPECIALIST/TECHNOLOGIST

## 2022-06-30 NOTE — TELEPHONE ENCOUNTER
advised that the patient may return full duty. Please return call to patient's mother and provide a note.

## 2022-06-30 NOTE — PROGRESS NOTES
[address]23 [facility] and [address], [address] [place] [address] [address]  Phone: [phone]   Fax:     [phone]      Physical Therapy Treatment Note/ Progress Report:     Date:  2022    Patient Name:  Pierce Tolliver    :  1975  MRN: [id number]  Restrictions/Precautions:    Medical/Treatment Diagnosis Information:  · Diagnosis: Status post ORIF of fracture of ankle Z98.890 Closed displaced trimalleolar fracture of right ankle S82.851A  · Treatment Diagnosis: Right ankle pain M25. 497  Insurance/Certification information:  PT Insurance Information: [facility]  Physician Information:   Dr. Rebecca Chacon  Has the plan of care been signed (Y/N):        []  Yes  [x]  No     Date of Patient follow up with Physician: 22    Is this a Progress Report:     []  Yes  [x]  No      If Yes:  Date Range for reporting period:  Beginnin22 ------------ Endin22    Progress report will be due (10 Rx or 30 days whichever is less):      Recertification will be due (POC Duration  / 90 days whichever is less): 22      Visit # Insurance Allowable Auth Required   In Person 9    2 12 (check auth)    8 visits 22-22 []  Yes     []  No    Tele Health 0  []  Yes     []  No    Total 11       FOTO Score: 42.6     Date assessed:  22  Next Visit     Latex Allergy:  [x]NO      []YES  Preferred Language for Healthcare:   [x]English       []other:    Pain level:  0-4 /10     SUBJECTIVE:  Pt notes that his ankle is sore. He closed last night at work. OBJECTIVE:    Observation:    Test measurements:      RESTRICTIONS/PRECAUTIONS:  3/17/22 s/p right ankle trimalleolus ORIF with syndesmosis repair   WBAT in the boot, and start aggressive ROM and peroneal strengthening exercise. Off the boot in 1-2 weeks.     Exercises/Interventions:   Therapeutic Ex (59748) Sets/sec Reps Notes/CUES HEP   Ankle pumps  vc    Long sitting calf 30s vc    Ankle circles vc    Seated ankle DF heel slides  vc    Foot roller  Seated incline calf stretch black incline   2   1' vc    Seated HR 2 10 vc    Seated TR 2 10 vc    Side to side wt shift   Fwd weight shift foot out front (+foam)  Fwd weight shift foot behind  2x1'  2x1'  2x1' vc    Bike  10'     Fitter  1' ea 2 ways    SB stretch/calf raises  Step ups at stair case and   Lateral Side step ups  Leg press  LUZ MARINA ABD R/L    Step onto foam              2 min/x30  8\" x30  8\"x30  x30  x30           80#  35#    Pt education 10 min  Reviewed precautions, HEP with gradual progression, goals of PT, not to push through pain with ex, use of RICE principles- pt stated understanding    Manual Intervention ([phone])       Massage, PROM,   10'                                        NMR re-education (74698)   CUES NEEDED    Gait training (emphasis on turning his foot in when ambulating)  8'                                                             Therapeutic Activity (14990)                                          Comprehensive Care access code:  [facility]           Therapeutic Exercise and NMR EXR  [x] (88171) Provided verbal/tactile cueing for activities related to strengthening, flexibility, endurance, ROM for improvements in LE, proximal hip, and core control with self care, mobility, lifting, ambulation. [x] (70602) Provided verbal/tactile cueing for activities related to improving balance, coordination, kinesthetic sense, posture, motor skill, proprioception to assist with LE, proximal hip, and core control in self-care, mobility, lifting, ambulation and eccentric single leg control.      NMR and Therapeutic Activities:    [x] (00621 or 34815) Provided verbal/tactile cueing for activities related to improving balance, coordination, kinesthetic sense, posture, motor skill, proprioception and motor activation to allow for proper function of core, proximal hip and LE with self-care and ADLs and functional mobility.   [] (35793) Gait Re-education- Provided training and instruction to the patient for proper LE, core and proximal hip recruitment and positioning and eccentric body weight control with ambulation re-education including up and down stairs     Home Exercise Program:    [x] (64417) Reviewed/Progressed HEP activities related to strengthening, flexibility, endurance, ROM of core, proximal hip and LE for functional self-care, mobility, lifting and ambulation/stair navigation   [] (94991) Reviewed/Progressed HEP activities related to improving balance, coordination, kinesthetic sense, posture, motor skill, proprioception of core, proximal hip and LE for self-care, mobility, lifting, and ambulation/stair navigation      Manual Treatments:  PROM / STM / Oscillations-Mobs:  G-I, II, III, IV (PA's, Inf., Post.)  [] (52125) Provided manual therapy to mobilize LE, proximal hip and/or LS spine soft tissue/joints for the purpose of modulating pain, promoting relaxation, increasing ROM, reducing/eliminating soft tissue swelling/inflammation/restriction, improving soft tissue extensibility and allowing for proper ROM for normal function with self-care, mobility, lifting and ambulation. Modalities:      [x] GAME READY (VASO)- for significant edema, swelling, pain control. Charges:  Timed Code Treatment Minutes: 45   Total Treatment Minutes:  55   BWC:  TE TIME:  NMR TIME:  MANUAL TIME:  UNTIMED MINUTES:  Medicare Total:         [] EVAL (LOW) 42024 (typically 20 minutes face-to-face)   [] EVAL (MOD) 47716 (typically 30 minutes face-to-face)  [] EVAL (HIGH) 64661 (typically 45 minutes face-to-face)  [] RE-EVAL     [x] AU(71778) x 2   500-530  [] IONTO  [x] NMR (19772) x   530-545              X     VASO 545-555  [] Manual (86637) x    [] Other:  [] TA x                [] Mech Traction (56051)  [] ES(attended) (86849)      [] ES (un) (48129):    ASSESSMENT:  Pt fatigues with above routine. No increased symptoms reported. Educated to use cane to minimize limp. Educated on cues for each exercise throughout session. GOALS:     Patient stated goal: Walking I no AD. Therapist goals for Patient:   Short Term Goals: To be achieved in: 2 weeks  1. Independent in HEP and progression per patient tolerance, in order to prevent re-injury. [] Progressing: [] Met: [] Not Met: [] Adjusted     2. Patient will have a decrease in pain to facilitate improvement in movement, function, and ADLs as indicated by Functional Deficits. [] Progressing: [] Met: [] Not Met: [] Adjusted     Long Term Goals: To be achieved in: 12 weeks  1. FOTO score will match or exceed predicted score to assist with reaching prior level of function. [] Progressing: [] Met: [] Not Met: [] Adjusted     2. Patient will demonstrate increased AROM to 8-10 deg DF to allow for proper joint functioning as indicated by patients Functional Deficits. [] Progressing: [] Met: [] Not Met: [] Adjusted     3. Patient will demonstrate an increase in Strength to good proximal hip strength and control, within 5lb HHD in LE to allow for proper functional mobility as indicated by patients Functional Deficits. [] Progressing: [] Met: [] Not Met: [] Adjusted     4. Patient will return to functional activities including standing/walking 30 mins I with LRAD without increased symptoms or restriction. [] Progressing: [] Met: [] Not Met: [] Adjusted     5. Patient will return to work duties as cleared by MD  (patient specific functional goal)    [] Progressing: [] Met: [] Not Met: [] Adjusted         Overall Progression Towards Functional goals/ Treatment Progress Update:  [] Patient is progressing as expected towards functional goals listed. [] Progression is slowed due to complexities/Impairments listed. [] Progression has been slowed due to co-morbidities.   [x] Plan just implemented, too soon to assess goals progression <30days   [] Goals require adjustment due to lack of progress  [] Patient is not progressing as expected and requires additional follow up with physician  [] Other    Prognosis for POC: [x] Good [] Fair  [] Poor      Patient requires continued skilled intervention: [x] Yes  [] No    Treatment/Activity Tolerance:  [x] Patient able to complete treatment  [] Patient limited by fatigue  [] Patient limited by pain    [] Patient limited by other medical complications  [] Other:     Return to Play: (if applicable)   []  Stage 1: Intro to Strength   []  Stage 2: Return to Run and Strength   []  Stage 3: Return to Jump and Strength   []  Stage 4: Dynamic Strength and Agility   []  Stage 5: Sport Specific Training     []  Ready to Return to Play, Meets All Above Stages   []  Not Ready for Return to Sports   Comments:                          PLAN: See eval  [x] Continue per plan of care [] Alter current plan (see comments above)  [] Plan of care initiated [] Hold pending MD visit [] Discharge    Electronically signed by:  Kelin Hurd PTA, MHI, ATC    Note: If patient does not return for scheduled/ recommended follow up visits, this note will serve as a discharge from care along with most recent update on progress.

## 2022-07-05 ENCOUNTER — HOSPITAL ENCOUNTER (OUTPATIENT)
Dept: PHYSICAL THERAPY | Age: 47
Setting detail: THERAPIES SERIES
Discharge: HOME | End: 2022-07-05
Payer: COMMERCIAL

## 2022-07-05 PROCEDURE — 97530 THERAPEUTIC ACTIVITIES: CPT | Performed by: SPECIALIST/TECHNOLOGIST

## 2022-07-05 PROCEDURE — 97110 THERAPEUTIC EXERCISES: CPT | Performed by: SPECIALIST/TECHNOLOGIST

## 2022-07-05 PROCEDURE — 97016 VASOPNEUMATIC DEVICE THERAPY: CPT | Performed by: SPECIALIST/TECHNOLOGIST

## 2022-07-05 PROCEDURE — 97112 NEUROMUSCULAR REEDUCATION: CPT | Performed by: SPECIALIST/TECHNOLOGIST

## 2022-07-05 NOTE — PROGRESS NOTES
723 [facility] and [address]  Phone: [phone]   Fax:     [phone]      Physical Therapy Treatment Note/ Progress Report:     Date:  2022    Patient Name:  Oz Bey    :  1975  MRN: [id number]  Restrictions/Precautions:    Medical/Treatment Diagnosis Information:  · Diagnosis: Status post ORIF of fracture of ankle Z98.890 Closed displaced trimalleolar fracture of right ankle S82.851A  · Treatment Diagnosis: Right ankle pain M25. 343  Insurance/Certification information:  PT Insurance Information: [facility]  Physician Information:   Dr. Willow Carrera  Has the plan of care been signed (Y/N):        []  Yes  [x]  No     Date of Patient follow up with Physician: 22    Is this a Progress Report:     []  Yes  [x]  No      If Yes:  Date Range for reporting period:  Beginnin22 ------------ Endin22    Progress report will be due (10 Rx or 30 days whichever is less):      Recertification will be due (POC Duration  / 90 days whichever is less): 22      Visit # Insurance Allowable Auth Required   In Person 9    3 12 (check auth)    8 visits 22-22 []  Yes     []  No    Tele Health 0  []  Yes     []  No    Total 12       FOTO Score: 42.6     Date assessed:  22  Next Visit     Latex Allergy:  [x]NO      []YES  Preferred Language for Healthcare:   [x]English       []other:    Pain level:  0-4 /10     SUBJECTIVE:  Pt notes that his ankle is improving. He has been working more. OBJECTIVE:    Observation:    Test measurements:      RESTRICTIONS/PRECAUTIONS:  3/17/22 s/p right ankle trimalleolus ORIF with syndesmosis repair   WBAT in the boot, and start aggressive ROM and peroneal strengthening exercise. Off the boot in 1-2 weeks.     Exercises/Interventions:   Therapeutic Ex (85839) Sets/sec Reps Notes/CUES HEP   Ankle pumps  vc    Long sitting calf 30s vc    Ankle circles vc    Seated ankle DF heel slides  vc    Foot roller  Seated incline calf stretch black incline   2   1' vc    Seated HR 2 10 vc    Seated TR 2 10 vc    Side to side wt shift   Fwd weight shift foot out front (+foam)  Fwd weight shift foot behind  2x1'  2x1'  2x1' vc    Bike  10'     Fitter  1' ea 2 ways    SB stretch/calf raises  Step ups at stair case and   Lateral Side step ups  Leg press  LUZ MARINA ABD R/L    Step onto foam              2 min/x30  8\" x30  8\"x30  x30  x30           80#  35#    Pt education 10 min  Reviewed precautions, HEP with gradual progression, goals of PT, not to push through pain with ex, use of RICE principles- pt stated understanding    Manual Intervention ([phone])       Massage, PROM,   10'                                        NMR re-education (57834)   CUES NEEDED    Gait training (emphasis on turning his foot in when ambulating)  8'                                                             Therapeutic Activity (57687)                                          uTest access code:  [facility]           Therapeutic Exercise and NMR EXR  [x] (96703) Provided verbal/tactile cueing for activities related to strengthening, flexibility, endurance, ROM for improvements in LE, proximal hip, and core control with self care, mobility, lifting, ambulation. [x] (50024) Provided verbal/tactile cueing for activities related to improving balance, coordination, kinesthetic sense, posture, motor skill, proprioception to assist with LE, proximal hip, and core control in self-care, mobility, lifting, ambulation and eccentric single leg control.      NMR and Therapeutic Activities:    [x] (80385 or 51083) Provided verbal/tactile cueing for activities related to improving balance, coordination, kinesthetic sense, posture, motor skill, proprioception and motor activation to allow for proper function of core, proximal hip and LE with self-care and ADLs and functional mobility.   [] (97342) Gait Re-education- Provided training and instruction to the patient for proper LE, core and proximal hip recruitment and positioning and eccentric body weight control with ambulation re-education including up and down stairs     Home Exercise Program:    [x] (60550) Reviewed/Progressed HEP activities related to strengthening, flexibility, endurance, ROM of core, proximal hip and LE for functional self-care, mobility, lifting and ambulation/stair navigation   [] (66779) Reviewed/Progressed HEP activities related to improving balance, coordination, kinesthetic sense, posture, motor skill, proprioception of core, proximal hip and LE for self-care, mobility, lifting, and ambulation/stair navigation      Manual Treatments:  PROM / STM / Oscillations-Mobs:  G-I, II, III, IV (PA's, Inf., Post.)  [] (97863) Provided manual therapy to mobilize LE, proximal hip and/or LS spine soft tissue/joints for the purpose of modulating pain, promoting relaxation, increasing ROM, reducing/eliminating soft tissue swelling/inflammation/restriction, improving soft tissue extensibility and allowing for proper ROM for normal function with self-care, mobility, lifting and ambulation. Modalities:      [x] GAME READY (VASO)- for significant edema, swelling, pain control. Charges:  Timed Code Treatment Minutes: 55   Total Treatment Minutes:  65   BWC:  TE TIME:  NMR TIME:  MANUAL TIME:  UNTIMED MINUTES:  Medicare Total:         [] EVAL (LOW) 60950 (typically 20 minutes face-to-face)   [] EVAL (MOD) 46105 (typically 30 minutes face-to-face)  [] EVAL (HIGH) 34248 (typically 45 minutes face-to-face)  [] RE-EVAL     [x] FF(08152) x 2   100-130  [] IONTO  [x] NMR (59458) x   130-145              X     VASO 200-210  [] Manual (78483) x                 [] Other:  [x] TA x    145-200           [] Mech Traction (76487)  [] ES(attended) (82157)      [] ES (un) (84107):    ASSESSMENT:  Pt fatigues with above routine. No increased symptoms reported.  Educated to use cane to minimize limp. Educated on cues for each exercise throughout session. GOALS:     Patient stated goal: Walking I no AD. Therapist goals for Patient:   Short Term Goals: To be achieved in: 2 weeks  1. Independent in HEP and progression per patient tolerance, in order to prevent re-injury. [] Progressing: [] Met: [] Not Met: [] Adjusted     2. Patient will have a decrease in pain to facilitate improvement in movement, function, and ADLs as indicated by Functional Deficits. [] Progressing: [] Met: [] Not Met: [] Adjusted     Long Term Goals: To be achieved in: 12 weeks  1. FOTO score will match or exceed predicted score to assist with reaching prior level of function. [] Progressing: [] Met: [] Not Met: [] Adjusted     2. Patient will demonstrate increased AROM to 8-10 deg DF to allow for proper joint functioning as indicated by patients Functional Deficits. [] Progressing: [] Met: [] Not Met: [] Adjusted     3. Patient will demonstrate an increase in Strength to good proximal hip strength and control, within 5lb HHD in LE to allow for proper functional mobility as indicated by patients Functional Deficits. [] Progressing: [] Met: [] Not Met: [] Adjusted     4. Patient will return to functional activities including standing/walking 30 mins I with LRAD without increased symptoms or restriction. [] Progressing: [] Met: [] Not Met: [] Adjusted     5. Patient will return to work duties as cleared by MD  (patient specific functional goal)    [] Progressing: [] Met: [] Not Met: [] Adjusted         Overall Progression Towards Functional goals/ Treatment Progress Update:  [] Patient is progressing as expected towards functional goals listed. [] Progression is slowed due to complexities/Impairments listed. [] Progression has been slowed due to co-morbidities.   [x] Plan just implemented, too soon to assess goals progression <30days   [] Goals require adjustment due to lack of progress  [] Patient is

## 2022-07-07 ENCOUNTER — HOSPITAL ENCOUNTER (OUTPATIENT)
Dept: PHYSICAL THERAPY | Age: 47
Setting detail: THERAPIES SERIES
Discharge: HOME | End: 2022-07-07
Payer: COMMERCIAL

## 2022-07-07 PROCEDURE — 97110 THERAPEUTIC EXERCISES: CPT | Performed by: SPECIALIST/TECHNOLOGIST

## 2022-07-07 PROCEDURE — 97016 VASOPNEUMATIC DEVICE THERAPY: CPT | Performed by: SPECIALIST/TECHNOLOGIST

## 2022-07-07 PROCEDURE — 97530 THERAPEUTIC ACTIVITIES: CPT | Performed by: SPECIALIST/TECHNOLOGIST

## 2022-07-07 PROCEDURE — 97112 NEUROMUSCULAR REEDUCATION: CPT | Performed by: SPECIALIST/TECHNOLOGIST

## 2022-07-07 NOTE — PROGRESS NOTES
723 [facility] and [address], [address] [place] [address] [address]  Phone: [phone]   Fax:     [phone]      Physical Therapy Treatment Note/ Progress Report:     Date:  2022    Patient Name:  Seema Hodge    :  1975  MRN: [id number]  Restrictions/Precautions:    Medical/Treatment Diagnosis Information:  · Diagnosis: Status post ORIF of fracture of ankle Z98.890 Closed displaced trimalleolar fracture of right ankle S82.851A  · Treatment Diagnosis: Right ankle pain M25. 423  Insurance/Certification information:  PT Insurance Information: [facility]  Physician Information:   Dr. Cherylene Highland  Has the plan of care been signed (Y/N):        []  Yes  [x]  No     Date of Patient follow up with Physician: 22    Is this a Progress Report:     []  Yes  [x]  No      If Yes:  Date Range for reporting period:  Beginnin22 ------------ Endin22    Progress report will be due (10 Rx or 30 days whichever is less):      Recertification will be due (POC Duration  / 90 days whichever is less): 22      Visit # Insurance Allowable Auth Required   In Person 9    4 12 (check auth)    8 visits 22-22 []  Yes     []  No    Tele Health 0  []  Yes     []  No    Total 13       FOTO Score: 42.6     Date assessed:  22  Next Visit     Latex Allergy:  [x]NO      []YES  Preferred Language for Healthcare:   [x]English       []other:    Pain level:  0-4 /10     SUBJECTIVE:  Pt notes that his ankle is feeling ok. He has a rash around both ankles from walking in the garden. OBJECTIVE:    Observation:    Test measurements:      RESTRICTIONS/PRECAUTIONS:  3/17/22 s/p right ankle trimalleolus ORIF with syndesmosis repair   WBAT in the boot, and start aggressive ROM and peroneal strengthening exercise. Off the boot in 1-2 weeks.     Exercises/Interventions:   Therapeutic Ex (12448) Sets/sec Reps Notes/CUES HEP   Ankle pumps  vc    Long sitting calf 30s vc    Ankle circles  vc    Seated ankle DF heel slides  vc    Foot roller  Seated incline calf stretch black incline   2   1' vc    Seated HR 2 10 vc    Seated TR 2 10 vc    Side to side wt shift   Fwd weight shift foot out front (+foam)  Fwd weight shift foot behind  2x1'  2x1'  2x1' vc    Bike  10'     Fitter  1' ea 2 ways    SB stretch/calf raises  Step ups at stair case and   Lateral Side step ups  Leg press  [facility] ABD/EXT/FLX R/L  BOSU steps FWD/LAT    Step onto foam              2 min/x30  8\" x30  8\"x30  x30  x30 ea  x10 ea         110#  50#    Pt education 10 min  Reviewed precautions, HEP with gradual progression, goals of PT, not to push through pain with ex, use of RICE principles- pt stated understanding    Manual Intervention (69386)       Massage, PROM,   10'                                        NMR re-education (35976)   CUES NEEDED    Gait training (emphasis on turning his foot in when ambulating)  8'                                                             Therapeutic Activity (99372)                                          TrackBill access code:  [facility]           Therapeutic Exercise and NMR EXR  [x] (84302) Provided verbal/tactile cueing for activities related to strengthening, flexibility, endurance, ROM for improvements in LE, proximal hip, and core control with self care, mobility, lifting, ambulation. [x] (71291) Provided verbal/tactile cueing for activities related to improving balance, coordination, kinesthetic sense, posture, motor skill, proprioception to assist with LE, proximal hip, and core control in self-care, mobility, lifting, ambulation and eccentric single leg control.      NMR and Therapeutic Activities:    [x] (56130 or 51972) Provided verbal/tactile cueing for activities related to improving balance, coordination, kinesthetic sense, posture, motor skill, proprioception and motor activation to allow for proper function of core, proximal hip and LE with self-care and ADLs and functional mobility.   [] (25181) Gait Re-education- Provided training and instruction to the patient for proper LE, core and proximal hip recruitment and positioning and eccentric body weight control with ambulation re-education including up and down stairs     Home Exercise Program:    [x] (42034) Reviewed/Progressed HEP activities related to strengthening, flexibility, endurance, ROM of core, proximal hip and LE for functional self-care, mobility, lifting and ambulation/stair navigation   [] (59097) Reviewed/Progressed HEP activities related to improving balance, coordination, kinesthetic sense, posture, motor skill, proprioception of core, proximal hip and LE for self-care, mobility, lifting, and ambulation/stair navigation      Manual Treatments:  PROM / STM / Oscillations-Mobs:  G-I, II, III, IV (PA's, Inf., Post.)  [] (74051) Provided manual therapy to mobilize LE, proximal hip and/or LS spine soft tissue/joints for the purpose of modulating pain, promoting relaxation, increasing ROM, reducing/eliminating soft tissue swelling/inflammation/restriction, improving soft tissue extensibility and allowing for proper ROM for normal function with self-care, mobility, lifting and ambulation. Modalities:      [x] GAME READY (VASO)- for significant edema, swelling, pain control.      Charges:  Timed Code Treatment Minutes: 55   Total Treatment Minutes:  65   [facility]:  TE TIME:  NMR TIME:  MANUAL TIME:  UNTIMED MINUTES:  Medicare Total:         [] EVAL (LOW) 93281 (typically 20 minutes face-to-face)   [] EVAL (MOD) 84991 (typically 30 minutes face-to-face)  [] EVAL (HIGH) 64271 (typically 45 minutes face-to-face)  [] RE-EVAL     [x] XM(71080) x 2   2665-2728  [] IONTO  [x] NMR (19326) x                 X     VASO 0498-3389  [] Manual (75375) x                 [] Other:  [x] TA x    0063-2991          [] [facility] Traction (51602)  [] ES(attended) (01399)      [] ES (un) (93900):    ASSESSMENT:  Pt fatigues with above routine. No increased symptoms reported. Educated to use cane to minimize limp. Educated on cues for each exercise throughout session. GOALS:     Patient stated goal: Walking I no AD. Therapist goals for Patient:   Short Term Goals: To be achieved in: 2 weeks  1. Independent in HEP and progression per patient tolerance, in order to prevent re-injury. [] Progressing: [] Met: [] Not Met: [] Adjusted     2. Patient will have a decrease in pain to facilitate improvement in movement, function, and ADLs as indicated by Functional Deficits. [] Progressing: [] Met: [] Not Met: [] Adjusted     Long Term Goals: To be achieved in: 12 weeks  1. FOTO score will match or exceed predicted score to assist with reaching prior level of function. [] Progressing: [] Met: [] Not Met: [] Adjusted     2. Patient will demonstrate increased AROM to 8-10 deg DF to allow for proper joint functioning as indicated by patients Functional Deficits. [] Progressing: [] Met: [] Not Met: [] Adjusted     3. Patient will demonstrate an increase in Strength to good proximal hip strength and control, within 5lb HHD in LE to allow for proper functional mobility as indicated by patients Functional Deficits. [] Progressing: [] Met: [] Not Met: [] Adjusted     4. Patient will return to functional activities including standing/walking 30 mins I with LRAD without increased symptoms or restriction. [] Progressing: [] Met: [] Not Met: [] Adjusted     5. Patient will return to work duties as cleared by MD  (patient specific functional goal)    [] Progressing: [] Met: [] Not Met: [] Adjusted         Overall Progression Towards Functional goals/ Treatment Progress Update:  [] Patient is progressing as expected towards functional goals listed. [] Progression is slowed due to complexities/Impairments listed. [] Progression has been slowed due to co-morbidities.   [x] Plan just implemented, too soon to assess goals progression <30days   [] Goals require adjustment due to lack of progress  [] Patient is not progressing as expected and requires additional follow up with physician  [] Other    Prognosis for POC: [x] Good [] Fair  [] Poor      Patient requires continued skilled intervention: [x] Yes  [] No    Treatment/Activity Tolerance:  [x] Patient able to complete treatment  [] Patient limited by fatigue  [] Patient limited by pain    [] Patient limited by other medical complications  [] Other:     Return to Play: (if applicable)   []  Stage 1: Intro to Strength   []  Stage 2: Return to Run and Strength   []  Stage 3: Return to Jump and Strength   []  Stage 4: Dynamic Strength and Agility   []  Stage 5: Sport Specific Training     []  Ready to Return to Play, Meets All Above Stages   []  Not Ready for Return to Sports   Comments:                          PLAN:   [x] Continue per plan of care [] Alter current plan (see comments above)  [] Plan of care initiated [] Hold pending MD visit [] Discharge    Electronically signed by:  Pallavi Sears, SHANITA, MHI, ATC    Note: If patient does not return for scheduled/ recommended follow up visits, this note will serve as a discharge from care along with most recent update on progress.

## 2022-07-11 NOTE — PROGRESS NOTES
DIAGNOSIS:  Right ankle trimalleolus fracture, status post ORIF, with syndesmosis repair. DATE OF SURGERY:  3/17/2022, Worker's Comp. HISTORY OF PRESENT ILLNESS:  Mr. Muller 48 y.o.  male who came in today for 3 months postoperative visit. The patient denies any significant pain in the right ankle 5/10. He has been in regular shoes, and WB using a knee scooter. No numbness or tingling sensation. No fever or Chills. He came with his parents. He works at [employer]. Past Medical History:   Diagnosis Date    Hyperlipidemia     Hypertension        Past Surgical History:   Procedure Laterality Date    ANKLE FRACTURE SURGERY Right 3/17/2022    OPEN REDUCTION INTERNAL FIXATION RIGHT ANKLE WITH SYNDESMOSIS REPAIR (21105, 09929)-RADHA performed by Camilo Alvarado MD at [address] [place] [address] History     Socioeconomic History    Marital status: Single     Spouse name: Not on file    Number of children: Not on file    Years of education: Not on file    Highest education level: Not on file   Occupational History    Not on file   Tobacco Use    Smoking status: Never Smoker    Smokeless tobacco: Never Used   Vaping Use    Vaping Use: Never used   Substance and Sexual Activity    Alcohol use: Never    Drug use: Never    Sexual activity: Not on file   Other Topics Concern    Not on file   Social History Narrative    Not on file     Social Determinants of Health     Financial Resource Strain:     Difficulty of Paying Living Expenses: Not on file   Food Insecurity:     Worried About [address] in the Last Year: Not on file    [address] in the Last Year: Not on file   Transportation Needs:     Lack of Transportation (Medical): Not on file    Lack of Transportation (Non-Medical):  Not on file   Physical Activity:     Days of Exercise per Week: Not on file    Minutes of Exercise per Session: Not on file   Stress:     Feeling of Stress : Not on file   Social Connections:     Frequency of Communication with Friends and Family: Not on file    Frequency of Social Gatherings with Friends and Family: Not on file    Attends Synagogue Services: Not on file    Active Member of Clubs or Organizations: Not on file    Attends Club or Organization Meetings: Not on file    Marital Status: Not on file   Intimate Partner Violence:     Fear of Current or Ex-Partner: Not on file    Emotionally Abused: Not on file    Physically Abused: Not on file    Sexually Abused: Not on file   Housing Stability:     Unable to Pay for Housing in the Last Year: Not on file    Number of [place] in the Last Year: Not on file    Unstable Housing in the Last Year: Not on file       No family history on file. Current Outpatient Medications on File Prior to Visit   Medication Sig Dispense Refill    acetaminophen (TYLENOL) 160 MG/5ML suspension Take 15 mg/kg by mouth every 4 hours as needed for Fever      loratadine (CLARITIN) 10 MG capsule Take by mouth      fenofibrate (TRIGLIDE) 160 MG tablet TAKE 1 TABLET EVERY DAY WITH FOOD      amLODIPine (NORVASC) 5 MG tablet TAKE 1 TABLET EVERY DAY      ibuprofen (CHILDRENS ADVIL) 100 MG/5ML suspension Take 30 mLs by mouth every 8 hours as needed for Pain 900 mL 0     No current facility-administered medications on file prior to visit. Pertinent items are noted in HPI  Review of systems reviewed from Patient History Form and available in the patient's chart under the Media tab. No change noted. PHYSICAL EXAMINATION:  Mr. Aurora Bautista is a very pleasant 48 y.o.  male who presents today in no acute distress, awake, alert, and oriented. He is well dressed, nourished and  groomed. Patient with normal affect. Height is  5' 7\" (1.702 m), weight is 230 lb (104.3 kg), Body mass index is 36.02 kg/m². Resting respiratory rate is 16. The patient walks with no limp. The incision healed well. No signs of any erythema or drainage, mild swelling.  He has no pain with the active or passive range of motion of the right ankle, but decrease ROM. He has intact sensation distally, and he is neurovascularly intact. IMAGING:  Three views right ankle showed anatomic alignment of the fracture, plate and screws and syndesmosis screw in good position, no loosening. Ankle mortise is well centered. IMPRESSION:  3 months out from right ankle trimalleolus ORIF with syndesmosis repair, and doing very well. PLAN: He will be WBAT, and start aggressive ROM and peroneal strengthening exercise. I discussed with the patient that I think that he would really benefit from a course of physical therapy for further strengthening and stretching. The patient will come back for a follow up in 4 weeks. At that time, we will take 3 views of the right ankle standing. He will need a staged procedure with syndesmosis screw removal 4-5 months postop.       Jon Ferrer MD

## 2022-07-12 ENCOUNTER — HOSPITAL ENCOUNTER (OUTPATIENT)
Dept: PHYSICAL THERAPY | Age: 47
Setting detail: THERAPIES SERIES
Discharge: HOME | End: 2022-07-12
Payer: COMMERCIAL

## 2022-07-12 PROCEDURE — 97112 NEUROMUSCULAR REEDUCATION: CPT | Performed by: SPECIALIST/TECHNOLOGIST

## 2022-07-12 PROCEDURE — 97016 VASOPNEUMATIC DEVICE THERAPY: CPT | Performed by: SPECIALIST/TECHNOLOGIST

## 2022-07-12 PROCEDURE — 97110 THERAPEUTIC EXERCISES: CPT | Performed by: SPECIALIST/TECHNOLOGIST

## 2022-07-12 PROCEDURE — 97530 THERAPEUTIC ACTIVITIES: CPT | Performed by: SPECIALIST/TECHNOLOGIST

## 2022-07-12 NOTE — PROGRESS NOTES
[facility] 3560 [facility], [address]  Phone: [phone]   Fax:     [phone]    [facility]23 [facility] and [address] [place] [address], [address] 3560 [facility], [address]  Phone: [phone]   Fax: [phone]    Date: 2022          Patient Name; :  Fara Boeck; 1975   · Dx:   Diagnosis: Status post ORIF of fracture of ankle Z98.890 Closed displaced trimalleolar fracture of right ankle S82.851A  · Treatment Diagnosis: Right ankle pain M25. 571        Physician:   Dr. Chuy Sanchez        Total PT Visits: 14     Measures Previous Current   Pain (0-10) 0-4/10 0-2/10   FOTO 42.6 67/71   ROM Dorsi Flx -10 Dorsi flx +10    Plantar Flx 35 Plantar flx 38        Strength 3-/5 4-/5               Specific Functional Improvements & Impressions: Pt has demonstrated good improvement of ROM and function. Plan & Recommendations:  [x] Continue rehabilitation due to objective improvement and continued functional deficits with frequency and duration:  [] Progress toward  []GAP, []Work Conditioning, []Independent HEP   [] Discharge due to   [] All goals achieved, [] Maximized \"medical necessity\" [] No subjective or objective improvements      Electronically signed by:  Anjelica Hernandez, PTA, MHI, ATC  Therapy Plan of Care Re-Certification  This patient has been re-evaluated for physical therapy services and for therapy to continue, Medicare, Medicaid and other insurances require periodic physician review of the treatment plan.  Please review the above re-evaluation and verify that you agree with plan of care as established above by signing the attached document and return it to our office or note changes to established plan below  [] Follow treatment plan as above [] Discontinue physical therapy  [] Change plan to: __________________________________________________    Physician Signature:____________________________________ Date:____________  By signing above, therapists plan is approved by physician    If you have any questions or concerns, please don't hesitate to call. Thank you for your referral.    Physical Therapy Treatment Note/ Progress Report:     Date:  2022    Patient Name:  Jack aLnier    :  1975  MRN: [id number]  Restrictions/Precautions:    Medical/Treatment Diagnosis Information:  · Diagnosis: Status post ORIF of fracture of ankle Z98.890 Closed displaced trimalleolar fracture of right ankle S82.851A  · Treatment Diagnosis: Right ankle pain M25. 592  Insurance/Certification information:  PT Insurance Information: [facility]  Physician Information:   Dr. Jareth Lopes  Has the plan of care been signed (Y/N):        []  Yes  [x]  No     Date of Patient follow up with Physician: 22    Is this a Progress Report:     []  Yes  [x]  No      If Yes:  Date Range for reporting period:  Beginnin22 ------------ Endin22    Progress report will be due (10 Rx or 30 days whichever is less):      Recertification will be due (POC Duration  / 90 days whichever is less): 22      Visit # Insurance Allowable Auth Required   In Person 9    5 12 (check auth)    8 visits 22-22 []  Yes     []  No    Tele Health 0  []  Yes     []  No    Total 14       FOTO Score:  67/71    Date assessed:    22     Latex Allergy:  [x]NO      []YES  Preferred Language for Healthcare:   [x]English       []other:    Pain level:  0-4 /10     SUBJECTIVE:  Pt notes that he is feeling better. OBJECTIVE:    Observation:    Test measurements:      RESTRICTIONS/PRECAUTIONS:  3/17/22 s/p right ankle trimalleolus ORIF with syndesmosis repair   WBAT in the boot, and start aggressive ROM and peroneal strengthening exercise. Off the boot in 1-2 weeks.     Exercises/Interventions:   Therapeutic Ex (61726) Sets/sec Reps Notes/CUES HEP   Ankle pumps  vc    Long sitting calf 30s vc    Ankle circles  vc    Seated ankle DF heel slides  vc    Foot roller  Seated incline calf stretch black incline   2   1' vc    Seated HR 2 10 vc    Seated TR 2 10 vc    Side to side wt shift   Fwd weight shift foot out front (+foam)  Fwd weight shift foot behind  2x1'  2x1'  2x1' vc    Bike  10'     Fitter  1' ea 2 ways    SB stretch/calf raises  Step ups at stair case and   Lateral Side step ups  Leg press  [facility] ABD/EXT/FLX R/L  BOSU steps FWD/LAT    Step onto foam              2 min/x30  8\" x30  8\"x30  x30  x30 ea  x10 ea         110#  50#    Pt education 10 min  Reviewed precautions, HEP with gradual progression, goals of PT, not to push through pain with ex, use of RICE principles- pt stated understanding    Manual Intervention (10128)       Massage, PROM,   10'                                        NMR re-education (92935)   CUES NEEDED    Gait training (emphasis on turning his foot in when ambulating)  8'                                                             Therapeutic Activity (56134)                                          [employer] Insurance access code:  [facility]           Therapeutic Exercise and NMR EXR  [x] (10481) Provided verbal/tactile cueing for activities related to strengthening, flexibility, endurance, ROM for improvements in LE, proximal hip, and core control with self care, mobility, lifting, ambulation. [x] (43429) Provided verbal/tactile cueing for activities related to improving balance, coordination, kinesthetic sense, posture, motor skill, proprioception to assist with LE, proximal hip, and core control in self-care, mobility, lifting, ambulation and eccentric single leg control.      NMR and Therapeutic Activities:    [x] (18831 or 45237) Provided verbal/tactile cueing for activities related to improving balance, coordination, kinesthetic sense, posture, motor skill, proprioception and motor activation to allow for proper [] ES (un) (89996):    ASSESSMENT:  Pt fatigues with above routine. No increased symptoms reported. Educated to use cane to minimize limp. Educated on cues for each exercise throughout session. GOALS:     Patient stated goal: Walking I no AD. Therapist goals for Patient:   Short Term Goals: To be achieved in: 2 weeks  1. Independent in HEP and progression per patient tolerance, in order to prevent re-injury. [] Progressing: [] Met: [] Not Met: [] Adjusted     2. Patient will have a decrease in pain to facilitate improvement in movement, function, and ADLs as indicated by Functional Deficits. [] Progressing: [] Met: [] Not Met: [] Adjusted     Long Term Goals: To be achieved in: 12 weeks  1. FOTO score will match or exceed predicted score to assist with reaching prior level of function. [] Progressing: [] Met: [] Not Met: [] Adjusted     2. Patient will demonstrate increased AROM to 8-10 deg DF to allow for proper joint functioning as indicated by patients Functional Deficits. [] Progressing: [] Met: [] Not Met: [] Adjusted     3. Patient will demonstrate an increase in Strength to good proximal hip strength and control, within 5lb HHD in LE to allow for proper functional mobility as indicated by patients Functional Deficits. [] Progressing: [] Met: [] Not Met: [] Adjusted     4. Patient will return to functional activities including standing/walking 30 mins I with LRAD without increased symptoms or restriction. [] Progressing: [] Met: [] Not Met: [] Adjusted     5. Patient will return to work duties as cleared by MD  (patient specific functional goal)    [] Progressing: [] Met: [] Not Met: [] Adjusted         Overall Progression Towards Functional goals/ Treatment Progress Update:  [] Patient is progressing as expected towards functional goals listed. [] Progression is slowed due to complexities/Impairments listed. [] Progression has been slowed due to co-morbidities.   [x] Plan just implemented, too soon to assess goals progression <30days   [] Goals require adjustment due to lack of progress  [] Patient is not progressing as expected and requires additional follow up with physician  [] Other    Prognosis for POC: [x] Good [] Fair  [] Poor      Patient requires continued skilled intervention: [x] Yes  [] No    Treatment/Activity Tolerance:  [x] Patient able to complete treatment  [] Patient limited by fatigue  [] Patient limited by pain    [] Patient limited by other medical complications  [] Other:     Return to Play: (if applicable)   []  Stage 1: Intro to Strength   []  Stage 2: Return to Run and Strength   []  Stage 3: Return to Jump and Strength   []  Stage 4: Dynamic Strength and Agility   []  Stage 5: Sport Specific Training     []  Ready to Return to Play, Meets All Above Stages   []  Not Ready for Return to Sports   Comments:                          PLAN:   [x] Continue per plan of care [] Alter current plan (see comments above)  [] Plan of care initiated [] Hold pending MD visit [] Discharge    Electronically signed by:  Rizwana Garcia, SHANITA, MHI, ATC    Note: If patient does not return for scheduled/ recommended follow up visits, this note will serve as a discharge from care along with most recent update on progress.

## 2022-07-14 ENCOUNTER — HOSPITAL ENCOUNTER (OUTPATIENT)
Dept: PHYSICAL THERAPY | Age: 47
Setting detail: THERAPIES SERIES
Discharge: HOME | End: 2022-07-14
Payer: COMMERCIAL

## 2022-07-14 PROCEDURE — 97016 VASOPNEUMATIC DEVICE THERAPY: CPT | Performed by: SPECIALIST/TECHNOLOGIST

## 2022-07-14 PROCEDURE — 97110 THERAPEUTIC EXERCISES: CPT | Performed by: SPECIALIST/TECHNOLOGIST

## 2022-07-14 PROCEDURE — 97530 THERAPEUTIC ACTIVITIES: CPT | Performed by: SPECIALIST/TECHNOLOGIST

## 2022-07-14 PROCEDURE — 97112 NEUROMUSCULAR REEDUCATION: CPT | Performed by: SPECIALIST/TECHNOLOGIST

## 2022-07-14 NOTE — PROGRESS NOTES
[facility]23 [facility] and [address]  Phone: [phone]   Fax:     [phone]      Physical Therapy Treatment Note/ Progress Report:     Date:  2022    Patient Name:  Chema Bird    :  1975  MRN: [id number]  Restrictions/Precautions:    Medical/Treatment Diagnosis Information:  · Diagnosis: Status post ORIF of fracture of ankle Z98.890 Closed displaced trimalleolar fracture of right ankle S82.851A  · Treatment Diagnosis: Right ankle pain M25. 151  Insurance/Certification information:  PT Insurance Information: [facility]  Physician Information:   Dr. Edward Cisneros  Has the plan of care been signed (Y/N):        []  Yes  [x]  No     Date of Patient follow up with Physician: 22    Is this a Progress Report:     []  Yes  [x]  No      If Yes:  Date Range for reporting period:  Beginnin22 ------------ Endin22    Progress report will be due (10 Rx or 30 days whichever is less):      Recertification will be due (POC Duration  / 90 days whichever is less): 22      Visit # Insurance Allowable Auth Required   In Person 9    6 12 (check auth)    8 visits 22-22 []  Yes     []  No    Tele Health 0  []  Yes     []  No    Total 15       FOTO Score:  67/71    Date assessed:    22     Latex Allergy:  [x]NO      []YES  Preferred Language for Healthcare:   [x]English       []other:    Pain level:  0-4 /10     SUBJECTIVE:  Pt notes that he is feeling better. He has been working without much issue. OBJECTIVE:    Observation:    Test measurements:      RESTRICTIONS/PRECAUTIONS:  3/17/22 s/p right ankle trimalleolus ORIF with syndesmosis repair   WBAT in the boot, and start aggressive ROM and peroneal strengthening exercise. Off the boot in 1-2 weeks.     Exercises/Interventions:   Therapeutic Ex (31160) Sets/sec Reps Notes/CUES HEP   Ankle pumps  vc    Long sitting calf 30s vc    Ankle circles  vc    Seated ankle DF heel slides  vc    Foot roller  Seated incline calf stretch black incline   2   1' vc    Seated HR 2 10 vc    Seated TR 2 10 vc    Side to side wt shift   Fwd weight shift foot out front (+foam)  Fwd weight shift foot behind  2x1'  2x1'  2x1' vc    Bike  10'     Fitter  1' ea 2 ways    SB stretch/calf raises  Step ups at stair case and   Lateral Side step ups  Leg press  [facility] ABD/EXT/FLX R/L  BOSU steps FWD/LAT    Step onto foam              2 min/x30  8\" x30  8\"x30  x30  x30 ea  x10 ea         110#  50#    Pt education 10 min  Reviewed precautions, HEP with gradual progression, goals of PT, not to push through pain with ex, use of RICE principles- pt stated understanding    Manual Intervention (19975)       Massage, PROM,   10'                                        NMR re-education (55296)   CUES NEEDED    Gait training (emphasis on turning his foot in when ambulating)  8'                                                             Therapeutic Activity (94096)                                          [employer] access code:  [facility]           Therapeutic Exercise and NMR EXR  [x] (82598) Provided verbal/tactile cueing for activities related to strengthening, flexibility, endurance, ROM for improvements in LE, proximal hip, and core control with self care, mobility, lifting, ambulation. [x] (33275) Provided verbal/tactile cueing for activities related to improving balance, coordination, kinesthetic sense, posture, motor skill, proprioception to assist with LE, proximal hip, and core control in self-care, mobility, lifting, ambulation and eccentric single leg control.      NMR and Therapeutic Activities:    [x] (81488 or 58148) Provided verbal/tactile cueing for activities related to improving balance, coordination, kinesthetic sense, posture, motor skill, proprioception and motor activation to allow for proper function of core, proximal hip and LE with self-care and ADLs and functional mobility.   [] (56545) Gait Re-education- Provided training and instruction to the patient for proper LE, core and proximal hip recruitment and positioning and eccentric body weight control with ambulation re-education including up and down stairs     Home Exercise Program:    [x] (94901) Reviewed/Progressed HEP activities related to strengthening, flexibility, endurance, ROM of core, proximal hip and LE for functional self-care, mobility, lifting and ambulation/stair navigation   [] (79035) Reviewed/Progressed HEP activities related to improving balance, coordination, kinesthetic sense, posture, motor skill, proprioception of core, proximal hip and LE for self-care, mobility, lifting, and ambulation/stair navigation      Manual Treatments:  PROM / STM / Oscillations-Mobs:  G-I, II, III, IV (PA's, Inf., Post.)  [] (95398) Provided manual therapy to mobilize LE, proximal hip and/or LS spine soft tissue/joints for the purpose of modulating pain, promoting relaxation, increasing ROM, reducing/eliminating soft tissue swelling/inflammation/restriction, improving soft tissue extensibility and allowing for proper ROM for normal function with self-care, mobility, lifting and ambulation. Modalities:      [x] GAME READY (VASO)- for significant edema, swelling, pain control. Charges:  Timed Code Treatment Minutes: 55   Total Treatment Minutes:  65   BWC:  TE TIME:  NMR TIME:  MANUAL TIME:  UNTIMED MINUTES:  Medicare Total:         [] EVAL (LOW) 66252 (typically 20 minutes face-to-face)   [] EVAL (MOD) 55173 (typically 30 minutes face-to-face)  [] EVAL (HIGH) 12560 (typically 45 minutes face-to-face)  [] RE-EVAL     [x] NJ(27205) x 2   100-130  [] IONTO  [x] NMR (29905) x   130-145              X     VASO 200-210  [] Manual (73316) x                 [] Other:  [x] TA x    145-200          [] Mech Traction (10867)  [] ES(attended) (54246)      [] ES (un) (18948):    ASSESSMENT:  Pt fatigues with above routine.  No increased symptoms reported. Educated to use cane to minimize limp. Educated on cues for each exercise throughout session. GOALS:     Patient stated goal: Walking I no AD. Therapist goals for Patient:   Short Term Goals: To be achieved in: 2 weeks  1. Independent in HEP and progression per patient tolerance, in order to prevent re-injury. [] Progressing: [] Met: [] Not Met: [] Adjusted     2. Patient will have a decrease in pain to facilitate improvement in movement, function, and ADLs as indicated by Functional Deficits. [] Progressing: [] Met: [] Not Met: [] Adjusted     Long Term Goals: To be achieved in: 12 weeks  1. FOTO score will match or exceed predicted score to assist with reaching prior level of function. [] Progressing: [] Met: [] Not Met: [] Adjusted     2. Patient will demonstrate increased AROM to 8-10 deg DF to allow for proper joint functioning as indicated by patients Functional Deficits. [] Progressing: [] Met: [] Not Met: [] Adjusted     3. Patient will demonstrate an increase in Strength to good proximal hip strength and control, within 5lb HHD in LE to allow for proper functional mobility as indicated by patients Functional Deficits. [] Progressing: [] Met: [] Not Met: [] Adjusted     4. Patient will return to functional activities including standing/walking 30 mins I with LRAD without increased symptoms or restriction. [] Progressing: [] Met: [] Not Met: [] Adjusted     5. Patient will return to work duties as cleared by MD  (patient specific functional goal)    [] Progressing: [] Met: [] Not Met: [] Adjusted         Overall Progression Towards Functional goals/ Treatment Progress Update:  [] Patient is progressing as expected towards functional goals listed. [] Progression is slowed due to complexities/Impairments listed. [] Progression has been slowed due to co-morbidities.   [x] Plan just implemented, too soon to assess goals progression <30days   [] Goals require adjustment due to lack of progress  [] Patient is not progressing as expected and requires additional follow up with physician  [] Other    Prognosis for POC: [x] Good [] Fair  [] Poor      Patient requires continued skilled intervention: [x] Yes  [] No    Treatment/Activity Tolerance:  [x] Patient able to complete treatment  [] Patient limited by fatigue  [] Patient limited by pain    [] Patient limited by other medical complications  [] Other:     Return to Play: (if applicable)   []  Stage 1: Intro to Strength   []  Stage 2: Return to Run and Strength   []  Stage 3: Return to Jump and Strength   []  Stage 4: Dynamic Strength and Agility   []  Stage 5: Sport Specific Training     []  Ready to Return to Play, Meets All Above Stages   []  Not Ready for Return to Sports   Comments:                          PLAN:   [x] Continue per plan of care [] Alter current plan (see comments above)  [] Plan of care initiated [] Hold pending MD visit [] Discharge    Electronically signed by:  Carol Frausto, SHANITA, MHI, ATC    Note: If patient does not return for scheduled/ recommended follow up visits, this note will serve as a discharge from care along with most recent update on progress.

## 2022-07-19 ENCOUNTER — HOSPITAL ENCOUNTER (OUTPATIENT)
Dept: PHYSICAL THERAPY | Age: 47
Setting detail: THERAPIES SERIES
Discharge: HOME | End: 2022-07-19
Payer: COMMERCIAL

## 2022-07-19 PROCEDURE — 97016 VASOPNEUMATIC DEVICE THERAPY: CPT | Performed by: SPECIALIST/TECHNOLOGIST

## 2022-07-19 PROCEDURE — 97530 THERAPEUTIC ACTIVITIES: CPT | Performed by: SPECIALIST/TECHNOLOGIST

## 2022-07-19 PROCEDURE — 97110 THERAPEUTIC EXERCISES: CPT | Performed by: SPECIALIST/TECHNOLOGIST

## 2022-07-19 PROCEDURE — 97112 NEUROMUSCULAR REEDUCATION: CPT | Performed by: SPECIALIST/TECHNOLOGIST

## 2022-07-19 NOTE — PROGRESS NOTES
training and instruction to the patient for proper LE, core and proximal hip recruitment and positioning and eccentric body weight control with ambulation re-education including up and down stairs     Home Exercise Program:    [x] (21671) Reviewed/Progressed HEP activities related to strengthening, flexibility, endurance, ROM of core, proximal hip and LE for functional self-care, mobility, lifting and ambulation/stair navigation   [] (76714) Reviewed/Progressed HEP activities related to improving balance, coordination, kinesthetic sense, posture, motor skill, proprioception of core, proximal hip and LE for self-care, mobility, lifting, and ambulation/stair navigation      Manual Treatments:  PROM / STM / Oscillations-Mobs:  G-I, II, III, IV (PA's, Inf., Post.)  [] (41570) Provided manual therapy to mobilize LE, proximal hip and/or LS spine soft tissue/joints for the purpose of modulating pain, promoting relaxation, increasing ROM, reducing/eliminating soft tissue swelling/inflammation/restriction, improving soft tissue extensibility and allowing for proper ROM for normal function with self-care, mobility, lifting and ambulation. Modalities:      [x] GAME READY (VASO)- for significant edema, swelling, pain control. Charges:  Timed Code Treatment Minutes: 55   Total Treatment Minutes:  65   BWC:  TE TIME:  NMR TIME:  MANUAL TIME:  UNTIMED MINUTES:  Medicare Total:         [] EVAL (LOW) 03888 (typically 20 minutes face-to-face)   [] EVAL (MOD) 67829 (typically 30 minutes face-to-face)  [] EVAL (HIGH) 12119 (typically 45 minutes face-to-face)  [] RE-EVAL     [x] IP(28090) x 2   100-130  [] IONTO  [x] NMR (57750) x   130-145              X     VASO 200-210  [] Manual (34199) x                 [] Other:  [x] TA x    145-200          [] Mech Traction (27170)  [] ES(attended) (36383)      [] ES (un) (27649):    ASSESSMENT:  Pt fatigues with above routine. No increased symptoms reported.  Educated to use cane to minimize limp. Educated on cues for each exercise throughout session. GOALS:     Patient stated goal: Walking I no AD. Therapist goals for Patient:   Short Term Goals: To be achieved in: 2 weeks  1. Independent in HEP and progression per patient tolerance, in order to prevent re-injury. [] Progressing: [] Met: [] Not Met: [] Adjusted     2. Patient will have a decrease in pain to facilitate improvement in movement, function, and ADLs as indicated by Functional Deficits. [] Progressing: [] Met: [] Not Met: [] Adjusted     Long Term Goals: To be achieved in: 12 weeks  1. FOTO score will match or exceed predicted score to assist with reaching prior level of function. [] Progressing: [] Met: [] Not Met: [] Adjusted     2. Patient will demonstrate increased AROM to 8-10 deg DF to allow for proper joint functioning as indicated by patients Functional Deficits. [] Progressing: [] Met: [] Not Met: [] Adjusted     3. Patient will demonstrate an increase in Strength to good proximal hip strength and control, within 5lb HHD in LE to allow for proper functional mobility as indicated by patients Functional Deficits. [] Progressing: [] Met: [] Not Met: [] Adjusted     4. Patient will return to functional activities including standing/walking 30 mins I with LRAD without increased symptoms or restriction. [] Progressing: [] Met: [] Not Met: [] Adjusted     5. Patient will return to work duties as cleared by MD  (patient specific functional goal)    [] Progressing: [] Met: [] Not Met: [] Adjusted         Overall Progression Towards Functional goals/ Treatment Progress Update:  [] Patient is progressing as expected towards functional goals listed. [] Progression is slowed due to complexities/Impairments listed. [] Progression has been slowed due to co-morbidities.   [x] Plan just implemented, too soon to assess goals progression <30days   [] Goals require adjustment due to lack of progress  [] Patient is not progressing as expected and requires additional follow up with physician  [] Other    Prognosis for POC: [x] Good [] Fair  [] Poor      Patient requires continued skilled intervention: [x] Yes  [] No    Treatment/Activity Tolerance:  [x] Patient able to complete treatment  [] Patient limited by fatigue  [] Patient limited by pain    [] Patient limited by other medical complications  [] Other:     Return to Play: (if applicable)   []  Stage 1: Intro to Strength   []  Stage 2: Return to Run and Strength   []  Stage 3: Return to Jump and Strength   []  Stage 4: Dynamic Strength and Agility   []  Stage 5: Sport Specific Training     []  Ready to Return to Play, Meets All Above Stages   []  Not Ready for Return to Sports   Comments:                          PLAN:   [x] Continue per plan of care [] Alter current plan (see comments above)  [] Plan of care initiated [] Hold pending MD visit [] Discharge    Electronically signed by:  Pallavi Sears PTA, MHI, ATC    Note: If patient does not return for scheduled/ recommended follow up visits, this note will serve as a discharge from care along with most recent update on progress.

## 2022-07-21 ENCOUNTER — HOSPITAL ENCOUNTER (OUTPATIENT)
Dept: PHYSICAL THERAPY | Age: 47
Setting detail: THERAPIES SERIES
Discharge: HOME | End: 2022-07-21
Payer: COMMERCIAL

## 2022-07-21 PROCEDURE — 97112 NEUROMUSCULAR REEDUCATION: CPT | Performed by: SPECIALIST/TECHNOLOGIST

## 2022-07-21 PROCEDURE — 97110 THERAPEUTIC EXERCISES: CPT | Performed by: SPECIALIST/TECHNOLOGIST

## 2022-07-21 PROCEDURE — 97530 THERAPEUTIC ACTIVITIES: CPT | Performed by: SPECIALIST/TECHNOLOGIST

## 2022-07-21 PROCEDURE — 97016 VASOPNEUMATIC DEVICE THERAPY: CPT | Performed by: SPECIALIST/TECHNOLOGIST

## 2022-07-21 NOTE — PROGRESS NOTES
[facility] [facility] and [address] [address] [facility], [address] [place] [address] [address]  Phone: [phone]   Fax:     [phone]      Physical Therapy Treatment Note/ Progress Report:     Date:  2022    Patient Name:  Blessing Mckay    :  1975  MRN: [id number]  Restrictions/Precautions:    Medical/Treatment Diagnosis Information:  Diagnosis: Status post ORIF of fracture of ankle Z98.890 Closed displaced trimalleolar fracture of right ankle S82.851A  Treatment Diagnosis: Right ankle pain M25. 590  Insurance/Certification information:  PT Insurance Information: [facility]  Physician Information:   Dr. Mina Maki  Has the plan of care been signed (Y/N):        []  Yes  [x]  No     Date of Patient follow up with Physician: 22    Is this a Progress Report:     []  Yes  [x]  No      If Yes:  Date Range for reporting period:  Beginnin22 ------------ Endin22    Progress report will be due (10 Rx or 30 days whichever is less):      Recertification will be due (POC Duration  / 90 days whichever is less): 22      Visit # Insurance Allowable Auth Required   In Person 9    8 12 (check auth)    8 visits 22-22 []  Yes     []  No    [facility] 0  []  Yes     []  No    Total 17       FOTO Score:  67/71    Date assessed:    22     Latex Allergy:  [x]NO      []YES  Preferred Language for Healthcare:   [x]English       []other:    Pain level:  0-4 /10     SUBJECTIVE:  Pt notes his ankle is feeling pretty good. OBJECTIVE:   Observation:   Test measurements:      RESTRICTIONS/PRECAUTIONS:  3/17/22 s/p right ankle trimalleolus ORIF with syndesmosis repair   WBAT in the boot, and start aggressive ROM and peroneal strengthening exercise. Off the boot in 1-2 weeks.     Exercises/Interventions:   Therapeutic Ex (84272) Sets/sec Reps Notes/CUES HEP   Ankle pumps  vc    Long sitting calf 30s vc    Ankle circles  vc    Seated ankle DF heel slides  vc Foot roller  Seated incline calf stretch black incline   2 1' vc    Seated HR 2 10 vc    Seated TR 2 10 vc    Side to side wt shift   Fwd weight shift foot out front (+foam)  Fwd weight shift foot behind  2x1'  2x1'  2x1' vc    Bike  10'     Fitter  1' ea 2 ways    SB stretch/calf raises  Step ups at stair case and   Lateral Side step ups  Leg press  [address] ABD/EXT/FLX R/L  BOSU steps FWD/LAT    Step onto foam              2 min/x30  8\" x30  8\"x30  x30  x30 ea  x10 ea         110#  50#    Pt education 10 min  Reviewed precautions, HEP with gradual progression, goals of PT, not to push through pain with ex, use of RICE principles- pt stated understanding    Manual Intervention (86188)       Massage, PROM,  10'                                        NMR re-education (81977)   CUES NEEDED    Gait training (emphasis on turning his foot in when ambulating)  8'                                                             Therapeutic Activity (59279)                                          Offerpop access code:  [facility]           Therapeutic Exercise and NMR EXR  [x] (34055) Provided verbal/tactile cueing for activities related to strengthening, flexibility, endurance, ROM for improvements in LE, proximal hip, and core control with self care, mobility, lifting, ambulation. [x] (95195) Provided verbal/tactile cueing for activities related to improving balance, coordination, kinesthetic sense, posture, motor skill, proprioception to assist with LE, proximal hip, and core control in self-care, mobility, lifting, ambulation and eccentric single leg control.      NMR and Therapeutic Activities:    [x] (03983 or 00794) Provided verbal/tactile cueing for activities related to improving balance, coordination, kinesthetic sense, posture, motor skill, proprioception and motor activation to allow for proper function of core, proximal hip and LE with self-care and ADLs and functional mobility.   [] (48894) Gait Re-education- Provided minimize limp. Educated on cues for each exercise throughout session. GOALS:     Patient stated goal: Walking I no AD. Therapist goals for Patient:   Short Term Goals: To be achieved in: 2 weeks  1. Independent in HEP and progression per patient tolerance, in order to prevent re-injury. [] Progressing: [] Met: [] Not Met: [] Adjusted     2. Patient will have a decrease in pain to facilitate improvement in movement, function, and ADLs as indicated by Functional Deficits. [] Progressing: [] Met: [] Not Met: [] Adjusted     Long Term Goals: To be achieved in: 12 weeks  1. FOTO score will match or exceed predicted score to assist with reaching prior level of function. [] Progressing: [] Met: [] Not Met: [] Adjusted     2. Patient will demonstrate increased AROM to 8-10 deg DF to allow for proper joint functioning as indicated by patients Functional Deficits. [] Progressing: [] Met: [] Not Met: [] Adjusted     3. Patient will demonstrate an increase in Strength to good proximal hip strength and control, within 5lb HHD in LE to allow for proper functional mobility as indicated by patients Functional Deficits. [] Progressing: [] Met: [] Not Met: [] Adjusted     4. Patient will return to functional activities including standing/walking 30 mins I with LRAD without increased symptoms or restriction. [] Progressing: [] Met: [] Not Met: [] Adjusted     5. Patient will return to work duties as cleared by MD  (patient specific functional goal)    [] Progressing: [] Met: [] Not Met: [] Adjusted         Overall Progression Towards Functional goals/ Treatment Progress Update:  [] Patient is progressing as expected towards functional goals listed. [] Progression is slowed due to complexities/Impairments listed. [] Progression has been slowed due to co-morbidities.   [x] Plan just implemented, too soon to assess goals progression <30days   [] Goals require adjustment due to lack of progress  [] Patient is not progressing as expected and requires additional follow up with physician  [] Other    Prognosis for POC: [x] Good [] Fair  [] Poor      Patient requires continued skilled intervention: [x] Yes  [] No    Treatment/Activity Tolerance:  [x] Patient able to complete treatment  [] Patient limited by fatigue  [] Patient limited by pain    [] Patient limited by other medical complications  [] Other:     Return to Play: (if applicable)   []  Stage 1: Intro to Strength   []  Stage 2: Return to Run and Strength   []  Stage 3: Return to Jump and Strength   []  Stage 4: Dynamic Strength and Agility   []  Stage 5: Sport Specific Training     []  Ready to Return to Play, Meets All Above Stages   []  Not Ready for Return to Sports   Comments:                          PLAN:   [x] Continue per plan of care [] Alter current plan (see comments above)  [] Plan of care initiated [] Hold pending MD visit [] Discharge    Electronically signed by:  Chaz Roberts PTA, MHI, ATC    Note: If patient does not return for scheduled/ recommended follow up visits, this note will serve as a discharge from care along with most recent update on progress.

## 2022-07-26 ENCOUNTER — OFFICE VISIT (OUTPATIENT)
Dept: ORTHOPEDIC SURGERY | Age: 47
End: 2022-07-26
Payer: COMMERCIAL

## 2022-07-26 ENCOUNTER — APPOINTMENT (OUTPATIENT)
Dept: PHYSICAL THERAPY | Age: 47
End: 2022-07-26
Payer: COMMERCIAL

## 2022-07-26 VITALS — WEIGHT: 230 LBS | BODY MASS INDEX: 36.1 KG/M2 | HEIGHT: 67 IN | RESPIRATION RATE: 18 BRPM

## 2022-07-26 DIAGNOSIS — Z98.890 STATUS POST ORIF OF FRACTURE OF ANKLE: ICD-10-CM

## 2022-07-26 DIAGNOSIS — S82.851A CLOSED DISPLACED TRIMALLEOLAR FRACTURE OF RIGHT ANKLE, INITIAL ENCOUNTER: Primary | ICD-10-CM

## 2022-07-26 DIAGNOSIS — Z87.81 STATUS POST ORIF OF FRACTURE OF ANKLE: ICD-10-CM

## 2022-07-26 PROCEDURE — 99214 OFFICE O/P EST MOD 30 MIN: CPT | Performed by: ORTHOPAEDIC SURGERY

## 2022-07-26 NOTE — PROGRESS NOTES
amount of displacement and comminution of the fracture. I discussed the risks and benefits of surgery with the patient, including but not limited to infection, bleeding, pain, injury to nerves or blood vessels failure of the surgery and need for additional surgery. All the patient's questions were answered. We discussed an expected post-operative course. He  is understanding of this and wishes to proceed.        Shante Collazo MD

## 2022-07-26 NOTE — LETTER
[address] [place] [address] Dr Mark Tang [facility] [address]  Phone: [phone]  Fax: [phone]    Belen Lloyd MD        July 26, 2022     Patient: Lenice Leak   YOB: 1975   Date of Visit: 7/26/2022       To Whom It May Concern: It is my medical opinion that Lenice Leak is scheduled for surgery on 08/04/22. Please excuse from work on 08/05/22. If you have any questions or concerns, please don't hesitate to call.     Sincerely,        Belen Lloyd MD

## 2022-07-26 NOTE — LETTER
[facility]  Surgery Scheduling Form:    DEMOGRAPHICS:                                                                                                                  Patient Name:  Shantanu Croft  Patient :  1975   Patient SS#:      Patient Phone:  [phone] (home) [phone] (work) Alt. Patient Phone:    Patient Address:  [place] [address]    PCP:  Zeenat Staton MD     Insurance ID Number:  Payer/Plan Subscr  Sex Relation Sub. Ins. ID Effective Group Num   1. Tata Gomez * BERNARDO MILLAN 1975 Male Self 2022 3/11/22 [id number]                                   [address] [address]         DIAGNOSIS & PROCEDURE:                                                                                                Diagnosis:   right ankle painful hardware T84.84XA  Operation:    right syndesmosis screw removal   Location: [address] [place] [address]  Provider:  Nadine Huston MD      SCHEDULING INFORMATION:                                                                                             Requested Date:   22   OR Time:                        Patient Arrival Time:    OR Time Required:  15  Minutes  Anesthesia:  General  Equipment:    Mini C-Arm:  yes   Standard C-Arm:  No  Status:  Outpatient  PAT Required:  No  Comments:            Cherie Huston MD     22         Pre Operative Physician Prophylaxis Orders - SCIP Protocols      Patient: Shantanu Croft  :    1975    Procedure: right syndesmosis screw removal     HEIGHT:  Ht Readings from Last 1 Encounters:   22 5' 7\" (1.702 m)                      WEIGHT:  Wt Readings from Last 1 Encounters:   22 230 lb (104.3 kg)         History & Physical:  By Surgeon    Pre-Operative Antibiotic Order:     []  ----  No Antibiotic Ordered       [x]  ----  Give the following Antibiotic within 1 hour prior to start time:                                                      Cefazolin 2g IV <119.9kg (264lbs) OR 3g if >120kg (264lbs)       or      Clindamycin 900 mg IV (over 1 hour) if patient is allergic to           PENICILLINS or CAPHALOSPORIN       VTE prophylaxis [ ] Thigh hi  TEDS  [ ]  Knee Hi TEDS [ ] Foot Pumps [ ] Compression Devices      Physician Signature:     Date: 7/26/22  Time: 10:32 AM

## 2022-07-28 ENCOUNTER — APPOINTMENT (OUTPATIENT)
Dept: PHYSICAL THERAPY | Age: 47
End: 2022-07-28
Payer: COMMERCIAL

## 2022-07-29 ENCOUNTER — TELEPHONE (OUTPATIENT)
Dept: ORTHOPEDIC SURGERY | Age: 47
End: 2022-07-29

## 2022-07-29 NOTE — TELEPHONE ENCOUNTER
General Question     Subject: Patient's mom wants to make sure he is on the schedule for 8-4-22 for his screw removal. Please advise.     Patient's mom: [id number] Number: [phone]

## 2022-08-02 RX ORDER — TRIAMCINOLONE ACETONIDE 1 MG/G
CREAM TOPICAL 2 TIMES DAILY
COMMUNITY
Start: 2022-07-05

## 2022-08-02 RX ORDER — M-VIT,TX,IRON,MINS/CALC/FOLIC 27MG-0.4MG
1 TABLET ORAL DAILY
COMMUNITY

## 2022-08-02 NOTE — PROGRESS NOTES
Name_______________________________________Printed:____________________  Date and time of surgery__8/4/22 0730 [facility]______________________Arrival Time:____0600____________   1. The instructions given regarding when and if a patient needs to stop oral intake prior to surgery varies. Follow the specific instructions you were given                  _x__Nothing to eat or to drink after Midnight the night before.                   ____Carbo loading or ERAS instructions will be given to select patients-if you have been given those instructions -please do the following                           The evening before your surgery after dinner before midnight drink 40 ounces of gatorade. If you are diabetic use sugar free. The morning of surgery drink 40 ounces of water. This needs to be finished 3 hours prior to your surgery start time. 2. Take the following pills with a small sip of water on the morning of surgery: amlodipine                  Do not take blood pressure medications ending in pril or sartan the kathy prior to surgery or the morning of surgery_   3. Aspirin, Ibuprofen, Advil, Naproxen, Vitamin E and other Anti-inflammatory products and supplements should be stopped for 5 -7days before surgery or as directed by your physician. 4. Check with your Doctor regarding stopping Plavix, Coumadin,Eliquis, Lovenox,Effient,Pradaxa,Xarelto, Fragmin or other blood thinners and follow their instructions. 5. Do not smoke, and do not drink any alcoholic beverages 24 hours prior to surgery. This includes NA Beer. Refrain from the usage of any recreational drugs. 6. You may brush your teeth and gargle the morning of surgery. DO NOT SWALLOW WATER   7. You MUST make arrangements for a responsible adult to stay on site while you are here and take you home after your surgery. You will not be allowed to leave alone or drive yourself home. It is strongly suggested someone stay with you the first 24 hrs.  Your surgery will be cancelled if you do not have a ride home. 8. A parent/legal guardian must accompany a child scheduled for surgery and plan to stay at the hospital until the child is discharged. Please do not bring other children with you. 9. Please wear simple, loose fitting clothing to the hospital.  Norm Mitchell not bring valuables (money, credit cards, checkbooks, etc.) Do not wear any makeup (including no eye makeup) or nail polish on your fingers or toes. 10. DO NOT wear any jewelry or piercings on day of surgery. All body piercing jewelry must be removed. 11. If you have ___dentures, they will be removed before going to the OR; we will provide you a container. If you wear ___contact lenses or ___glasses, they will be removed; please bring a case for them. 12. Please see your family doctor/pediatrician for a history & physical and/or concerning medications. Bring any test results/reports from your physician's office. PCP__________________Phone___________H&P Appt. Date________             13 If you  have a Living Will and Durable Power of  for Healthcare, please bring in a copy. 15. Notify your Surgeon if you develop any illness between now and surgery  time, cough, cold, fever, sore throat, nausea, vomiting, etc.  Please notify your surgeon if you experience dizziness, shortness of breath or blurred vision between now & the time of your surgery             15. DO NOT shave your operative site 96 hours prior to surgery. For face & neck surgery, men may use an electric razor 48 hours prior to surgery. 16. Shower the night before or morning of surgery using an antibacterial soap or as you have been instructed. 17. To provide excellent care visitors will be limited to one in the room at any given time. 18.  Please bring picture ID and insurance card. 19.  Visit our web site for additional information:  [email or web address] 20.During flu season no children under the age of 15 are permitted in the hospital for the safety of all patients. 21. If you take a long acting insulin in the evening only  take half of your usual  dose the night  before your procedure              22. If you use a c-pap please bring DOS if staying overnight,             23.For your convenience [facility] has a pharmacy on site to fill your prescriptions. 24. If you use oxygen and have a portable tank please bring it  with you the DOS             25. Bring a complete list of all your medications with name and dose include any supplements. 26. Other__________________________________________   *Please call pre admission testing if you any further questions   [place] [address]    [facility]  [phone]   [address] [place] [address]       VISITOR POLICY(subject to change)    Current policy is 2 visitors per patient. No children. A mask is required. Visiting hours are 8a-8p. Overnight visitors will be at the discretion of the nurse. All above information reviewed with patient in person or by phone. Patient verbalizes understanding. All questions and concerns addressed.                                                                                                  Patient/Rep___patient's father Rubin_________________                                                                                                                                    PRE OP INSTRUCTIONS

## 2022-08-03 ENCOUNTER — ANESTHESIA EVENT (OUTPATIENT)
Dept: OPERATING ROOM | Age: 47
End: 2022-08-03
Payer: COMMERCIAL

## 2022-08-03 ENCOUNTER — TELEPHONE (OUTPATIENT)
Dept: ORTHOPEDIC SURGERY | Age: 47
End: 2022-08-03

## 2022-08-03 NOTE — TELEPHONE ENCOUNTER
Morenita notified the [address] letter was routed to [facility] for processing

## 2022-08-03 NOTE — PROGRESS NOTES
No orders for PAT received for patient. Called and spoke with [employer] at Dr. Phuong Peraza office as per IM Nichole Bustillos is out of the office today. [employer] stated she would relay the message.

## 2022-08-03 NOTE — TELEPHONE ENCOUNTER
[facility]  authorization would like a call about surgery scheduled tomorrow. A Surgery letter was never routed to [facility] to request . Please contact \[facility] [place] [facility]\"" at [phone] and Route SX Letter.

## 2022-08-04 ENCOUNTER — HOSPITAL ENCOUNTER (OUTPATIENT)
Age: 47
Setting detail: OUTPATIENT SURGERY
Discharge: HOME | End: 2022-08-04
Attending: ORTHOPAEDIC SURGERY | Admitting: ORTHOPAEDIC SURGERY
Payer: COMMERCIAL

## 2022-08-04 ENCOUNTER — ANESTHESIA (OUTPATIENT)
Dept: OPERATING ROOM | Age: 47
End: 2022-08-04
Payer: COMMERCIAL

## 2022-08-04 ENCOUNTER — TELEPHONE (OUTPATIENT)
Dept: ORTHOPEDIC SURGERY | Age: 47
End: 2022-08-04

## 2022-08-04 VITALS
OXYGEN SATURATION: 99 % | RESPIRATION RATE: 15 BRPM | BODY MASS INDEX: 34.53 KG/M2 | WEIGHT: 220 LBS | SYSTOLIC BLOOD PRESSURE: 126 MMHG | DIASTOLIC BLOOD PRESSURE: 72 MMHG | TEMPERATURE: 96.9 F | HEIGHT: 67 IN | HEART RATE: 73 BPM

## 2022-08-04 PROBLEM — Z96.9 RETAINED ORTHOPEDIC HARDWARE: Status: ACTIVE | Noted: 2022-08-04

## 2022-08-04 PROCEDURE — 7100000010 HC PHASE II RECOVERY - FIRST 15 MIN: Performed by: ORTHOPAEDIC SURGERY

## 2022-08-04 PROCEDURE — 2580000003 HC RX 258: Performed by: NURSE ANESTHETIST, CERTIFIED REGISTERED

## 2022-08-04 PROCEDURE — 6360000002 HC RX W HCPCS: Performed by: ORTHOPAEDIC SURGERY

## 2022-08-04 PROCEDURE — 6360000002 HC RX W HCPCS: Performed by: NURSE ANESTHETIST, CERTIFIED REGISTERED

## 2022-08-04 PROCEDURE — 7100000000 HC PACU RECOVERY - FIRST 15 MIN: Performed by: ORTHOPAEDIC SURGERY

## 2022-08-04 PROCEDURE — 7100000001 HC PACU RECOVERY - ADDTL 15 MIN: Performed by: ORTHOPAEDIC SURGERY

## 2022-08-04 PROCEDURE — 20680 REMOVAL OF IMPLANT DEEP: CPT | Performed by: ORTHOPAEDIC SURGERY

## 2022-08-04 PROCEDURE — 3600000004 HC SURGERY LEVEL 4 BASE: Performed by: ORTHOPAEDIC SURGERY

## 2022-08-04 PROCEDURE — 2709999900 HC NON-CHARGEABLE SUPPLY: Performed by: ORTHOPAEDIC SURGERY

## 2022-08-04 PROCEDURE — 3700000000 HC ANESTHESIA ATTENDED CARE: Performed by: ORTHOPAEDIC SURGERY

## 2022-08-04 PROCEDURE — 2500000003 HC RX 250 WO HCPCS: Performed by: ORTHOPAEDIC SURGERY

## 2022-08-04 PROCEDURE — 3600000014 HC SURGERY LEVEL 4 ADDTL 15MIN: Performed by: ORTHOPAEDIC SURGERY

## 2022-08-04 PROCEDURE — 3700000001 HC ADD 15 MINUTES (ANESTHESIA): Performed by: ORTHOPAEDIC SURGERY

## 2022-08-04 PROCEDURE — 2580000003 HC RX 258: Performed by: ORTHOPAEDIC SURGERY

## 2022-08-04 PROCEDURE — 7100000011 HC PHASE II RECOVERY - ADDTL 15 MIN: Performed by: ORTHOPAEDIC SURGERY

## 2022-08-04 RX ORDER — MEPERIDINE HYDROCHLORIDE 25 MG/ML
12.5 INJECTION INTRAMUSCULAR; INTRAVENOUS; SUBCUTANEOUS EVERY 5 MIN PRN
Status: CANCELLED | OUTPATIENT
Start: 2022-08-04

## 2022-08-04 RX ORDER — PROPOFOL 10 MG/ML
INJECTION, EMULSION INTRAVENOUS PRN
Status: DISCONTINUED | OUTPATIENT
Start: 2022-08-04 | End: 2022-08-04 | Stop reason: SDUPTHER

## 2022-08-04 RX ORDER — SODIUM CHLORIDE 0.9 % (FLUSH) 0.9 %
5-40 SYRINGE (ML) INJECTION PRN
Status: CANCELLED | OUTPATIENT
Start: 2022-08-04

## 2022-08-04 RX ORDER — FENTANYL CITRATE 50 UG/ML
INJECTION, SOLUTION INTRAMUSCULAR; INTRAVENOUS PRN
Status: DISCONTINUED | OUTPATIENT
Start: 2022-08-04 | End: 2022-08-04 | Stop reason: SDUPTHER

## 2022-08-04 RX ORDER — LIDOCAINE HYDROCHLORIDE 10 MG/ML
1 INJECTION, SOLUTION EPIDURAL; INFILTRATION; INTRACAUDAL; PERINEURAL
Status: CANCELLED | OUTPATIENT
Start: 2022-08-04 | End: 2022-08-04

## 2022-08-04 RX ORDER — SODIUM CHLORIDE 9 MG/ML
INJECTION, SOLUTION INTRAVENOUS PRN
Status: CANCELLED | OUTPATIENT
Start: 2022-08-04

## 2022-08-04 RX ORDER — MIDAZOLAM HYDROCHLORIDE 1 MG/ML
INJECTION INTRAMUSCULAR; INTRAVENOUS PRN
Status: DISCONTINUED | OUTPATIENT
Start: 2022-08-04 | End: 2022-08-04 | Stop reason: SDUPTHER

## 2022-08-04 RX ORDER — LABETALOL HYDROCHLORIDE 5 MG/ML
10 INJECTION, SOLUTION INTRAVENOUS
Status: CANCELLED | OUTPATIENT
Start: 2022-08-04

## 2022-08-04 RX ORDER — SODIUM CHLORIDE 9 MG/ML
INJECTION, SOLUTION INTRAVENOUS CONTINUOUS PRN
Status: DISCONTINUED | OUTPATIENT
Start: 2022-08-04 | End: 2022-08-04 | Stop reason: SDUPTHER

## 2022-08-04 RX ORDER — HYDROMORPHONE HCL 110MG/55ML
0.5 PATIENT CONTROLLED ANALGESIA SYRINGE INTRAVENOUS EVERY 5 MIN PRN
Status: CANCELLED | OUTPATIENT
Start: 2022-08-04

## 2022-08-04 RX ORDER — OXYCODONE HYDROCHLORIDE 5 MG/1
5 TABLET ORAL
Status: CANCELLED | OUTPATIENT
Start: 2022-08-04 | End: 2022-08-04

## 2022-08-04 RX ORDER — HYDRALAZINE HYDROCHLORIDE 20 MG/ML
10 INJECTION INTRAMUSCULAR; INTRAVENOUS
Status: CANCELLED | OUTPATIENT
Start: 2022-08-04

## 2022-08-04 RX ORDER — SODIUM CHLORIDE 0.9 % (FLUSH) 0.9 %
5-40 SYRINGE (ML) INJECTION EVERY 12 HOURS SCHEDULED
Status: CANCELLED | OUTPATIENT
Start: 2022-08-04

## 2022-08-04 RX ORDER — ONDANSETRON 2 MG/ML
INJECTION INTRAMUSCULAR; INTRAVENOUS PRN
Status: DISCONTINUED | OUTPATIENT
Start: 2022-08-04 | End: 2022-08-04 | Stop reason: SDUPTHER

## 2022-08-04 RX ORDER — ONDANSETRON 2 MG/ML
4 INJECTION INTRAMUSCULAR; INTRAVENOUS
Status: CANCELLED | OUTPATIENT
Start: 2022-08-04 | End: 2022-08-04

## 2022-08-04 RX ORDER — CEPHALEXIN 500 MG/1
500 CAPSULE ORAL 4 TIMES DAILY
Qty: 12 CAPSULE | Refills: 0 | Status: SHIPPED | OUTPATIENT
Start: 2022-08-04 | End: 2022-08-07

## 2022-08-04 RX ORDER — SODIUM CHLORIDE, SODIUM LACTATE, POTASSIUM CHLORIDE, CALCIUM CHLORIDE 600; 310; 30; 20 MG/100ML; MG/100ML; MG/100ML; MG/100ML
INJECTION, SOLUTION INTRAVENOUS CONTINUOUS
Status: CANCELLED | OUTPATIENT
Start: 2022-08-04

## 2022-08-04 RX ORDER — BUPIVACAINE HYDROCHLORIDE 5 MG/ML
INJECTION, SOLUTION EPIDURAL; INTRACAUDAL
Status: COMPLETED | OUTPATIENT
Start: 2022-08-04 | End: 2022-08-04

## 2022-08-04 RX ORDER — DEXAMETHASONE SODIUM PHOSPHATE 4 MG/ML
INJECTION, SOLUTION INTRA-ARTICULAR; INTRALESIONAL; INTRAMUSCULAR; INTRAVENOUS; SOFT TISSUE PRN
Status: DISCONTINUED | OUTPATIENT
Start: 2022-08-04 | End: 2022-08-04 | Stop reason: SDUPTHER

## 2022-08-04 RX ADMIN — MIDAZOLAM 2 MG: 1 INJECTION INTRAMUSCULAR; INTRAVENOUS at 07:33

## 2022-08-04 RX ADMIN — FENTANYL CITRATE 50 MCG: 50 INJECTION, SOLUTION INTRAMUSCULAR; INTRAVENOUS at 07:39

## 2022-08-04 RX ADMIN — CEFAZOLIN 2000 MG: 2 INJECTION, POWDER, FOR SOLUTION INTRAMUSCULAR; INTRAVENOUS at 07:34

## 2022-08-04 RX ADMIN — PROPOFOL 200 MG: 10 INJECTION, EMULSION INTRAVENOUS at 07:39

## 2022-08-04 RX ADMIN — FENTANYL CITRATE 50 MCG: 50 INJECTION, SOLUTION INTRAMUSCULAR; INTRAVENOUS at 07:42

## 2022-08-04 RX ADMIN — DEXAMETHASONE SODIUM PHOSPHATE 4 MG: 4 INJECTION, SOLUTION INTRAMUSCULAR; INTRAVENOUS at 07:41

## 2022-08-04 RX ADMIN — ONDANSETRON 4 MG: 2 INJECTION INTRAMUSCULAR; INTRAVENOUS at 07:41

## 2022-08-04 RX ADMIN — SODIUM CHLORIDE: 9 INJECTION, SOLUTION INTRAVENOUS at 07:35

## 2022-08-04 ASSESSMENT — LIFESTYLE VARIABLES: SMOKING_STATUS: 0

## 2022-08-04 NOTE — DISCHARGE INSTRUCTIONS
Post op instruction:  1- D/C home  2- Dx Right ankle retained syndesmosis screw. 3- WBAT right ankle  4- Elevation surgical site, with ice  5- Keep Drsg dry and clean  6- F/U in 2 weeks. Terence Huerta MD, 8/4/2022      ORTHOPEDIC/PODIATRY DISCHARGE INSTRUCTIONS    Follow your surgeons instructions. Make follow-up appointment. Observe operative area for signs of excessive bleeding such as a slow general ooze that saturates the dressing or bright red bleeding. In either case, apply pressure to the area and elevate if possible and call your surgeon right away. Observe the affected extremity for circulation or nerve impairment such as a change in color, numbness, tingling, coldness or increased pain. If any of these symptoms are present call your surgeon. Observe operative site for any signs of infection such as increased pain, redness, fever greater than 101 degrees, swelling, foul odor or drainage. Contact surgeon if any of these symptoms are present. If you become short of breath call your surgeon or go to the nearest emergency room. Remove dressing if directed by surgeon. Leave steristips or sutures or staples in place. You may loosen your ace wrap if it feels too tight, or if you have severe pain, or if it has swelling. Elevate extremity as directed by surgeon. You may shower when directed by surgeon. Use ice pack as directed by surgeon. Do not use heat. Avoid stress to suture line such as pulling, pushing or tugging. Take medications as ordered. Take pain medication with food. Do not drive or operate machinery while taking narcotics. Call your surgeon for any questions or problems. ANESTHESIA DISCHARGE INSTRUCTIONS    Wear your seatbelt home. You are under the influence of drugs-do not drink alcohol, drive, operate machinery, make any important decisions or sign any legal documents for 24 hours. Children should not ride bikes, [place] or play on gym sets for 24 hours after surgery.   A responsible adult needs to be with you for 24 hours. You may experience lightheadedness, dizziness, or sleepiness following surgery. Rest at home today- increase activity as tolerated. Progress slowly to a regular diet unless your physician has instructed you otherwise. Drink plenty of water. If persistent nausea and vomiting becomes a problem, call your physician. Coughing, sore throat and muscle aches are other side effects of anesthesia, and should improve with time. Do not drive or operate machinery while taking narcotics.

## 2022-08-04 NOTE — PROGRESS NOTES
Pt awake and alert at this time. Pt on RA, and VSS. Pt denies pain and nausea, tolerating PO. Skin warm ,  and able to wiggle toes. Pt meets criteria to be discharged from Phase 1.

## 2022-08-04 NOTE — H&P
Preoperative H&P Update    The patient's History and Physical in the medical record from 7/26/2022 was reviewed by me today. Past Medical History:   Diagnosis Date    Hyperlipidemia     Hypertension      Past Surgical History:   Procedure Laterality Date    ANKLE FRACTURE SURGERY Right 3/17/2022    OPEN REDUCTION INTERNAL FIXATION RIGHT ANKLE WITH SYNDESMOSIS REPAIR (84307, [id number])-RADHA performed by Estee Dickey MD at [facility]     No current facility-administered medications on file prior to encounter. Current Outpatient Medications on File Prior to Encounter   Medication Sig Dispense Refill    Multiple Vitamins-Minerals (THERAPEUTIC MULTIVITAMIN-MINERALS) tablet Take 1 tablet by mouth in the morning. triamcinolone (KENALOG) 0.1 % cream Apply topically 2 times daily      acetaminophen (TYLENOL) 160 MG/5ML suspension Take 15 mg/kg by mouth every 4 hours as needed for Fever      loratadine (CLARITIN) 10 MG capsule Take by mouth      fenofibrate (TRIGLIDE) 160 MG tablet TAKE 1 TABLET EVERY DAY WITH FOOD      amLODIPine (NORVASC) 5 MG tablet TAKE 1 TABLET EVERY DAY      ibuprofen (CHILDRENS ADVIL) 100 MG/5ML suspension Take 30 mLs by mouth every 8 hours as needed for Pain 900 mL 0       Allergies   Allergen Reactions    Seasonal       I reviewed the HPI, medications, allergies, reason for surgery, diagnosis and treatment plan and there has been no change. The patient was evaluated by me today. Physical exam findings for this update include:    Vitals:    08/04/22 0641   BP: 134/77   Pulse: 84   Resp: 20   Temp: (!) 96.7 °F (35.9 °C)   SpO2: 98%     Airway is intact  Chest: chest clear, no wheezing, rales, normal symmetric air entry, no tachypnea, retractions or cyanosis  Heart: regular rate and rhythm ; heart sounds normal  Findings on exam of the body region where surgery is to be performed include:  Right ankle retained syndesmosis screw.     Electronically signed by Estee Dickey MD on 8/4/2022 at 6:49 AM

## 2022-08-04 NOTE — ANESTHESIA PRE PROCEDURE
FRACTURE SURGERY Right 3/17/2022    OPEN REDUCTION INTERNAL FIXATION RIGHT ANKLE WITH SYNDESMOSIS REPAIR (58843, [id number])-RADHA performed by Gustabo Duncan MD at [address] [place] [address] History:    Social History     Tobacco Use    Smoking status: Never    Smokeless tobacco: Never   Substance Use Topics    Alcohol use: Never                                Counseling given: Not Answered      Vital Signs (Current):   Vitals:    08/02/22 1520   Weight: 220 lb (99.8 kg)   Height: 5' 7\" (1.702 m)                                              BP Readings from Last 3 Encounters:   03/17/22 105/73   03/17/22 122/67   03/12/22 (!) 162/102       NPO Status: Time of last liquid consumption: 2300                        Time of last solid consumption: 2300                        Date of last liquid consumption: 08/03/22                        Date of last solid food consumption: 08/03/22    BMI:   Wt Readings from Last 3 Encounters:   08/02/22 220 lb (99.8 kg)   07/26/22 230 lb (104.3 kg)   06/28/22 230 lb (104.3 kg)     Body mass index is 34.46 kg/m². CBC: No results found for: WBC, RBC, HGB, HCT, MCV, RDW, PLT    CMP: No results found for: NA, K, CL, CO2, BUN, CREATININE, GFRAA, AGRATIO, LABGLOM, GLUCOSE, GLU, PROT, CALCIUM, BILITOT, ALKPHOS, AST, ALT    POC Tests: No results for input(s): POCGLU, POCNA, POCK, POCCL, POCBUN, POCHEMO, POCHCT in the last 72 hours.     Coags: No results found for: PROTIME, INR, APTT    HCG (If Applicable): No results found for: PREGTESTUR, PREGSERUM, HCG, HCGQUANT     ABGs: No results found for: PHART, PO2ART, AYL5ZSX, MXU7XXY, BEART, B5AHVGDP     Type & Screen (If Applicable):  No results found for: LABABO, LABRH    Drug/Infectious Status (If Applicable):  No results found for: HIV, HEPCAB    COVID-19 Screening (If Applicable):   Lab Results   Component Value Date/Time    COVID19 Not Detected 03/17/2022 07:15 AM           Anesthesia Evaluation  Patient summary reviewed and Nursing notes reviewed no history of anesthetic complications:   Airway: Mallampati: III  TM distance: >3 FB   Neck ROM: full  Mouth opening: > = 3 FB   Dental: normal exam         Pulmonary:Negative Pulmonary ROS and normal exam  breath sounds clear to auscultation      (-) COPD, asthma, sleep apnea and not a current smoker                           Cardiovascular:    (+) hypertension:, hyperlipidemia    (-) past MI, CAD, CABG/stent, dysrhythmias,  angina and  CHF      Rhythm: regular  Rate: normal                    Neuro/Psych:   (+) psychiatric history (dev delay):   (-) seizures, TIA and CVA           GI/Hepatic/Renal: Neg GI/Hepatic/Renal ROS       (-) GERD, liver disease and no renal disease       Endo/Other: Negative Endo/Other ROS       (-) diabetes mellitus, hypothyroidism, hyperthyroidism               Abdominal:   (+) obese,           Vascular: Other Findings:           Anesthesia Plan      general     ASA 2       Induction: intravenous. MIPS: Postoperative opioids intended and Prophylactic antiemetics administered. Anesthetic plan and risks discussed with patient. Plan discussed with CRNA.                     Mary Gill MD   8/4/2022

## 2022-08-04 NOTE — BRIEF OP NOTE
Brief Postoperative Note      Patient: Aye Roper  YOB: 1975  MRN: [id number]    Date of Procedure: 8/4/2022    Pre-Op Diagnosis: T84.84XA RIGHT ANKLE PAINFUL HARDWARE    Post-Op Diagnosis: Same       Procedure(s):  RIGHT SYNDESMOSIS SCREW REMOVAL    Surgeon(s):  Abrahan Jacob MD    Assistant: Luis Spence CNP    Anesthesia: General    Estimated Blood Loss (mL): Minimal    Complications: None    Specimens:   * No specimens in log *    Implants:  Implant Name Type Inv. Item Serial No.  Lot No. LRB No. Used Action   SCREW BNE L65MM DIA4MM CANC SELF DRL [employer] NONLOCKING 1 2 - [id number]  SCREW BNE L65MM DIA4MM CANC SELF DRL ST [employer] NONLOCKING 1 2  RADHA [employer]-WD  Right 1 Explanted         Drains: * No LDAs found *    Findings: Same.     Electronically signed by Abrahan Jacob MD on 8/4/2022 at 4:10 PM

## 2022-08-04 NOTE — ANESTHESIA POSTPROCEDURE EVALUATION
Department of Anesthesiology  Postprocedure Note    Patient: Mark Beltre  MRN: [id number]  YOB: 1975  Date of evaluation: 8/4/2022      Procedure Summary     Date: 08/04/22 Room / Location: [address]    Anesthesia Start: 5017 Anesthesia Stop: 2303    Procedure: RIGHT SYNDESMOSIS SCREW REMOVAL (Right: Ankle) Diagnosis:       Painful orthopaedic hardware [facility])      (T84.84XA RIGHT ANKLE PAINFUL HARDWARE)    Surgeons: Mary Harvey MD Responsible Provider: Leigh Bernstein MD    Anesthesia Type: general ASA Status: 2          Anesthesia Type: No value filed.     Pia Phase I: Pia Score: 10    Pia Phase II:        Anesthesia Post Evaluation    Patient location during evaluation: PACU  Patient participation: complete - patient participated  Level of consciousness: awake and alert  Airway patency: patent  Nausea & Vomiting: no vomiting and no nausea  Complications: no  Cardiovascular status: hemodynamically stable  Respiratory status: acceptable  Hydration status: stable

## 2022-08-04 NOTE — TELEPHONE ENCOUNTER
Appointment Request     Patient requesting earlier appointment: Yes  Appointment offered to patient: 9-6-22  Patient  Mother: [phone]    Patient needs APPT in 2 weeks in [place].

## 2022-08-05 NOTE — OP NOTE
[facility] [address]                     [facility], [address]                                OPERATIVE REPORT    PATIENT NAME: Amy Mendes                        :        1975  MED REC NO:   [id number]                          ROOM:  ACCOUNT NO:   [de-identified]                           ADMIT DATE: 2022  PROVIDER:     Brandy Sethi MD    DATE OF PROCEDURE:  2022    PRIMARY CARE PHYSICIAN:  Bunny Boyce. MD Ayush    PREOPERATIVE DIAGNOSIS:  Right ankle retained deep implant/syndesmosis  screw. POSTOPERATIVE DIAGNOSIS:  Right ankle retained deep implant/syndesmosis  screw. OPERATION PERFORMED:  Removal of deep implant right ankle syndesmosis  screw. SURGEON:  Brandy Sethi MD    ASSISTANT:  Talib Mcqueen CNP    ANESTHESIA:  General anesthesia. ESTIMATED BLOOD LOSS:  Minimal.    COMPLICATIONS:  None. TOURNIQUET:  Right upper calf 250 mmHg. INDICATIONS:  This is a 76-year-old white male who sustained a  work-related injury when he worked at [employer] with a right ankle  trimalleolar fracture with syndesmosis disruption. All risks, benefits,  and alternatives were discussed with the patient and his parents and they  agreed to proceed with surgical removal.    Given the patient's Body mass index is 34.46 kg/m². added significant challenge to the procedure. It required significant physical and mental effort. It required 50% more time for such procedure. DESCRIPTION OF PROCEDURE:  The patient's right ankle was marked. He  received 2 gm Ancef IV preoperatively. The patient was then brought to  the operating room and underwent general anesthesia. A well-padded  tourniquet was placed in the right upper calf. The right lower  extremity was then prepped and draped in a regular sterile routine  fashion. A time-out was called confirming the patient name, site, and  procedure. Esmarch was used for exsanguination.   Tourniquet was inflated to 250  mmHg. The site of the screw was confirmed with the mini C-arm. We then  made an incision over the screw head area. Careful dissection was  performed. We were able to expose the screw head and using appropriate  screwdriver we were able to remove it at this point. At this point, we  found the screw was intact. We irrigated the incision copiously with  normal saline after we let the tourniquet down. We then closed the  incision with a 4-0 Monocryl. Steri-Strips was then applied. Dressing  was then applied in the form of Xeroform, 4 x 4, sterile Webril, and Ace  wrap. The patient tolerated the procedure well and was taken to the recovery  in stable condition. Danny Perales CNP was 1st Assist given the nature of the procedure that needed advanced assistance. She assisted in all aspect of the procedure, including but limited to draping in a sterile fashion, exposure of surgical area, controlling bleeding, retracting and protecting important structures, and surgical wound closure with dressing application. POSTOPERATIVE PLAN:  The patient will be discharged home. He can be  weightbearing as tolerated. No heavy impact activities for four to six  weeks.         Abisai Tolliver MD    D: 08/04/2022 16:18:15       T: 08/05/2022 2:47:08     [id number]  Job#: [id number]     Doc#: [id number]    CC:  Hunter iRley

## 2022-08-15 ENCOUNTER — TELEPHONE (OUTPATIENT)
Dept: ORTHOPEDIC SURGERY | Age: 47
End: 2022-08-15

## 2022-08-15 NOTE — TELEPHONE ENCOUNTER
[facility] is requesting a Tbgi-Oj-Iita regarding this patient's right ankle. Please contact Dr. Kisha Moore at  [phone].

## 2022-08-16 ENCOUNTER — TELEPHONE (OUTPATIENT)
Dept: ORTHOPEDIC SURGERY | Age: 47
End: 2022-08-16

## 2022-08-18 ENCOUNTER — OFFICE VISIT (OUTPATIENT)
Dept: ORTHOPEDIC SURGERY | Age: 47
End: 2022-08-18
Payer: COMMERCIAL

## 2022-08-18 VITALS — BODY MASS INDEX: 34.46 KG/M2 | HEIGHT: 67 IN

## 2022-08-18 DIAGNOSIS — Z96.9 RETAINED ORTHOPEDIC HARDWARE: Primary | ICD-10-CM

## 2022-08-18 DIAGNOSIS — S93.431D SYNDESMOTIC DISRUPTION OF RIGHT ANKLE, SUBSEQUENT ENCOUNTER: ICD-10-CM

## 2022-08-18 PROCEDURE — 99024 POSTOP FOLLOW-UP VISIT: CPT | Performed by: NURSE PRACTITIONER

## 2022-08-18 PROCEDURE — APPNB15 APP NON BILLABLE TIME 0-15 MINS: Performed by: NURSE PRACTITIONER

## 2022-08-18 NOTE — LETTER
[facility]  [address]  Phone: [phone]  Fax: [phone]    LEX Curry CNP        August 18, 2022     Patient: Tammy Gaffney   YOB: 1975   Date of Visit: 8/18/2022       To Whom It May Concern: It is my medical opinion that Tammy Gaffney may return to work on Sunday,August 21st with no restrictions. .    If you have any questions or concerns, please don't hesitate to call.     Sincerely,          LEX Curry CNP

## 2022-08-19 NOTE — PROGRESS NOTES
DIAGNOSIS:  Right ankle hardware removal, syndesmosis screw. DATE OF SURGERY:  8/4/2022. HISTORY OF PRESENT ILLNESS:  Mr. Marlen Briseno 48 y.o.  male mentally challenged who came in today for 2 weeks postoperative visit. The patient denies any significant pain in the right ankle. He reports improvement in his swelling. He has been WBAT. He recently completed PT. No numbness or tingling sensation. No fever or Chills. Denies smoking. He works at [employer]. PHYSICAL EXAMINATION:  The incision healing well . No signs of any erythema or drainage, moderate swelling. He has no pain with the active or passive range of motion of the right ankle, good ROM. He has intact sensation distally, and he is neurovascularly intact. IMAGING:  Three views right ankle taken today in the office showed hardware syndesmosis screw removal, no fracture. The remaining hardware is intact. IMPRESSION:  2 weeks out from right ankle syndesmosis screw removal and doing very well. PLAN: He can go back to normal activity with no heavy impact activities for 6 weeks. The patient will come back for a follow up in 3 months. At that time, we will take 3 views of the right ankle.       Olive Richardson, APRN - CNP

## 2022-08-19 NOTE — TELEPHONE ENCOUNTER
Attempted to reach provider at [facility]. Phone went to [email or web address]. A message was left for callback before 11 AM today.

## 2022-09-02 ENCOUNTER — TELEPHONE (OUTPATIENT)
Dept: ORTHOPEDIC SURGERY | Age: 47
End: 2022-09-02

## 2022-09-02 NOTE — TELEPHONE ENCOUNTER
Other 09/02/2022    [facility] dos [phone] request    Please notify Tyler Almaguer in Workers Comp when [employer] by [place] and complete

## 2022-11-22 ENCOUNTER — OFFICE VISIT (OUTPATIENT)
Dept: ORTHOPEDIC SURGERY | Age: 47
End: 2022-11-22
Payer: COMMERCIAL

## 2022-11-22 DIAGNOSIS — Z96.9 RETAINED ORTHOPEDIC HARDWARE: Primary | ICD-10-CM

## 2022-11-22 NOTE — PROGRESS NOTES
DIAGNOSIS:  Right ankle hardware removal, syndesmosis screw. DATE OF SURGERY:  8/4/2022. HISTORY OF PRESENT ILLNESS:  Marni Suazo 50 y.o.  male mentally challenged who came in today for 3 months postoperative visit. The patient denies any significant pain in the right ankle. He reports improvement in his swelling. He has been WBAT. He recently completed PT. No numbness or tingling sensation. No fever or Chills. Denies smoking. He works at [employer], and back to work. He had right ankle trimalleolus fracture, status post ORIF, with syndesmosis repair, 3/17/2022, Worker's Comp. Past Medical History:   Diagnosis Date    Hyperlipidemia     Hypertension        Past Surgical History:   Procedure Laterality Date    ANKLE FRACTURE SURGERY Right 3/17/2022    OPEN REDUCTION INTERNAL FIXATION RIGHT ANKLE WITH SYNDESMOSIS REPAIR ([id number], [id number])-RADHA performed by Santana Hernandez MD at [address] [place] [address] Right 8/4/2022    RIGHT SYNDESMOSIS SCREW REMOVAL performed by Santana Hernandez MD at [address] [place] [address] History     Socioeconomic History    Marital status: Single     Spouse name: Not on file    Number of children: Not on file    Years of education: Not on file    Highest education level: Not on file   Occupational History    Not on file   Tobacco Use    Smoking status: Never    Smokeless tobacco: Never   Vaping Use    Vaping Use: Never used   Substance and Sexual Activity    Alcohol use: Never    Drug use: Never    Sexual activity: Not on file   Other Topics Concern    Not on file   Social History Narrative    Not on file     Social Determinants of Health     Financial Resource Strain: Not on file   Food Insecurity: Not on file   Transportation Needs: Not on file   Physical Activity: Not on file   Stress: Not on file   Social Connections: Not on file   Intimate Partner Violence: Not on file   Housing Stability: Not on file       History reviewed. No pertinent family history.     Current Outpatient Medications on File Prior to Visit   Medication Sig Dispense Refill    Multiple Vitamins-Minerals (THERAPEUTIC MULTIVITAMIN-MINERALS) tablet Take 1 tablet by mouth in the morning. triamcinolone (KENALOG) 0.1 % cream Apply topically 2 times daily      acetaminophen (TYLENOL) 160 MG/5ML suspension Take 15 mg/kg by mouth every 4 hours as needed for Fever      loratadine (CLARITIN) 10 MG capsule Take by mouth      fenofibrate (TRIGLIDE) 160 MG tablet TAKE 1 TABLET EVERY DAY WITH FOOD      amLODIPine (NORVASC) 5 MG tablet TAKE 1 TABLET EVERY DAY      ibuprofen (CHILDRENS ADVIL) 100 MG/5ML suspension Take 30 mLs by mouth every 8 hours as needed for Pain 900 mL 0     No current facility-administered medications on file prior to visit. Pertinent items are noted in HPI  Review of systems reviewed from Patient History Form and available in the patient's chart under the Media tab. No change noted. PHYSICAL EXAMINATION:  Mr. Sandra Garcia is a very pleasant 50 y.o.  male who presents today in no acute distress, awake, alert, and oriented. He is well dressed, nourished and  groomed. Patient with normal affect. Height is   , weight is  , There is no height or weight on file to calculate BMI. Resting respiratory rate is 16. The patient walks with no limp. The incision healing well. No signs of any erythema or drainage, moderate swelling. He has no pain with the active or passive range of motion of the right ankle, good ROM. He has intact sensation distally, and he is neurovascularly intact. IMAGING:  Three views right ankle taken today in the office showed hardware syndesmosis screw removal, no fracture. The remaining hardware is intact. IMPRESSION:  3 months out from right ankle syndesmosis screw removal and doing very well. PLAN: He can go back to normal activity. He will be WBAT, and start aggressive ROM and peroneal strengthening exercise. NSAIDs OTC.  He can go back to normal activity with no restrictions. He will be seen PRN. I told the patient that it is not unusual to have some achy pain and swelling for up to a year after a fracture.        Rosa Harrison MD

## (undated) DEVICE — MEDICINE CUP, GRADUATED, STER: Brand: MEDLINE

## (undated) DEVICE — SUTURE MCRYL + SZ 4-0 L27IN ABSRB UD L19MM PS-2 3/8 CIR MCP426H

## (undated) DEVICE — SYRINGE IRRIG 60ML SFT PLIABLE BLB EZ TO GRP 1 HND USE W/

## (undated) DEVICE — GLOVE ORTHO 8   MSG9480

## (undated) DEVICE — TOWEL,OR,DSP,ST,BLUE,STD,4/PK,20PK/CS: Brand: MEDLINE

## (undated) DEVICE — INTENDED FOR TISSUE SEPARATION, AND OTHER PROCEDURES THAT REQUIRE A SHARP SURGICAL BLADE TO PUNCTURE OR CUT.: Brand: BARD-PARKER ® STAINLESS STEEL BLADES

## (undated) DEVICE — GAUZE,SPONGE,4"X4",8PLY,STRL,LF,10/TRAY: Brand: MEDLINE

## (undated) DEVICE — MASTISOL ADHESIVE LIQ 2/3ML

## (undated) DEVICE — 3M™ STERI-STRIP™ REINFORCED ADHESIVE SKIN CLOSURES, R1547, 1/2 IN X 4 IN (12 MM X 100 MM), 6 STRIPS/ENVELOPE: Brand: 3M™ STERI-STRIP™

## (undated) DEVICE — MASC TURNOVER KIT: Brand: MEDLINE INDUSTRIES, INC.

## (undated) DEVICE — APPLICATOR MEDICATED 26 CC SOLUTION HI LT ORNG CHLORAPREP

## (undated) DEVICE — PACK PROCEDURE SURG EXTREMITY MFFOP CUST

## (undated) DEVICE — SYRINGE 60ML BULB IRRIG ST LF

## (undated) DEVICE — ELECTRODE PT RET AD L9FT HI MOIST COND ADH HYDRGEL CORDED

## (undated) DEVICE — BANDAGE COMPR W4INXL12FT E DISP ESMARCH EVEN

## (undated) DEVICE — BIT DRL DIA2MM STD QUIK CONN FOR PERIARTC LOK PLT SYS

## (undated) DEVICE — PADDING CAST W4INXL4YD ST COT RAYON MICROPLEATED HIGHLY

## (undated) DEVICE — GUIDEWIRE ORTH L6IN DIA1.6MM PART THRD TIP FOR CANN SCR

## (undated) DEVICE — GLOVE SURG SZ 65 L12IN THK75MIL DK GRN LTX FREE

## (undated) DEVICE — GLOVE ORANGE PI 8   MSG9080

## (undated) DEVICE — BANDAGE COMPR W6INXL10YD ST M E WHITE/BEIGE

## (undated) DEVICE — DRAPE,U/ SHT,SPLIT,PLAS,STERIL: Brand: MEDLINE

## (undated) DEVICE — Z DISCONTINUED GLOVE SURG SZ 7 L12IN FNGR THK13MIL WHT ISOLEX POLYISOPRENE

## (undated) DEVICE — SUTURE VCRL + SZ 3-0 L18IN ABSRB UD SH 1/2 CIR TAPERCUT NDL VCP864D

## (undated) DEVICE — T-DRAPE,EXTREMITY,STERILE: Brand: MEDLINE

## (undated) DEVICE — DRESSING,GAUZE,XEROFORM,CURAD,1"X8",ST: Brand: CURAD

## (undated) DEVICE — SPONGE LAP W18XL18IN WHT COT 4 PLY FLD STRUNG RADPQ DISP ST

## (undated) DEVICE — BLADE CLIPPER GEN PURP NS

## (undated) DEVICE — GLOVE SURG SZ 65 THK91MIL LTX FREE SYN POLYISOPRENE

## (undated) DEVICE — Z CONVERTED USE 2273164 BANDAGE COMPR W4INXL4 1/2YD E EC SGL LAYERED CLP CLSR ECONO

## (undated) DEVICE — STRIP,CLOSURE,WOUND,MEDI-STRIP,1/2X4: Brand: MEDLINE

## (undated) DEVICE — BANDAGE,ELASTIC,ESMARK,STERILE,6"X9',LF: Brand: MEDLINE

## (undated) DEVICE — BIT DRL DIA2.7MM CANN QUIK CPL

## (undated) DEVICE — HYPODERMIC SAFETY NEEDLE: Brand: MAGELLAN

## (undated) DEVICE — SOLUTION IRRIG 500ML 0.9% SOD CHL USP POUR PLAS BTL

## (undated) DEVICE — 3 ML SYRINGE LUER-LOCK TIP: Brand: MONOJECT

## (undated) DEVICE — SUTURE VCRL + SZ 2-0 L18IN ABSRB UD CT1 L36MM 1/2 CIR VCP839D

## (undated) DEVICE — ZIMMER® STERILE DISPOSABLE TOURNIQUET CUFF WITH PLC, DUAL PORT, SINGLE BLADDER, 34 IN. (86 CM)

## (undated) DEVICE — K WIRE FIX L150MM DIA1.6MM TRCR PNT 1 END FOR SM FRAG UNIV
Type: IMPLANTABLE DEVICE | Site: ANKLE | Status: NON-FUNCTIONAL
Removed: 2022-03-17

## (undated) DEVICE — C-ARM: Brand: UNBRANDED

## (undated) DEVICE — BIT DRL L100MM DIA2.5MM FOR SM FRAG UNIV LOK SYS